# Patient Record
Sex: MALE | Race: WHITE | NOT HISPANIC OR LATINO | Employment: OTHER | URBAN - METROPOLITAN AREA
[De-identification: names, ages, dates, MRNs, and addresses within clinical notes are randomized per-mention and may not be internally consistent; named-entity substitution may affect disease eponyms.]

---

## 2019-08-23 RX ORDER — IRBESARTAN AND HYDROCHLOROTHIAZIDE 300; 12.5 MG/1; MG/1
1 TABLET, FILM COATED ORAL DAILY
Qty: 90 TABLET | Refills: 1 | Status: SHIPPED | OUTPATIENT
Start: 2019-08-23 | End: 2019-10-31 | Stop reason: SDUPTHER

## 2019-10-31 ENCOUNTER — OFFICE VISIT (OUTPATIENT)
Dept: CARDIOLOGY | Facility: CLINIC | Age: 74
End: 2019-10-31

## 2019-10-31 VITALS
SYSTOLIC BLOOD PRESSURE: 143 MMHG | DIASTOLIC BLOOD PRESSURE: 80 MMHG | OXYGEN SATURATION: 96 % | HEIGHT: 68 IN | HEART RATE: 67 BPM | WEIGHT: 315 LBS | BODY MASS INDEX: 47.74 KG/M2

## 2019-10-31 DIAGNOSIS — E78.5 DYSLIPIDEMIA: ICD-10-CM

## 2019-10-31 DIAGNOSIS — I25.118 CORONARY ARTERY DISEASE OF NATIVE ARTERY OF NATIVE HEART WITH STABLE ANGINA PECTORIS (HCC): Primary | ICD-10-CM

## 2019-10-31 PROCEDURE — 99213 OFFICE O/P EST LOW 20 MIN: CPT | Performed by: INTERNAL MEDICINE

## 2019-10-31 PROCEDURE — 93000 ELECTROCARDIOGRAM COMPLETE: CPT | Performed by: INTERNAL MEDICINE

## 2019-10-31 RX ORDER — ALLOPURINOL 100 MG/1
TABLET ORAL 2 TIMES DAILY
COMMUNITY

## 2019-10-31 RX ORDER — ROSUVASTATIN CALCIUM 20 MG/1
TABLET, COATED ORAL DAILY
COMMUNITY
Start: 2019-09-10 | End: 2019-10-31 | Stop reason: SDUPTHER

## 2019-10-31 RX ORDER — CARVEDILOL 6.25 MG/1
6.25 TABLET ORAL 2 TIMES DAILY WITH MEALS
Qty: 180 TABLET | Refills: 4 | Status: SHIPPED | OUTPATIENT
Start: 2019-10-31 | End: 2019-11-05 | Stop reason: SDUPTHER

## 2019-10-31 RX ORDER — IRBESARTAN AND HYDROCHLOROTHIAZIDE 300; 12.5 MG/1; MG/1
1 TABLET, FILM COATED ORAL DAILY
Qty: 90 TABLET | Refills: 4 | Status: SHIPPED | OUTPATIENT
Start: 2019-10-31 | End: 2020-01-01

## 2019-10-31 RX ORDER — TRIAMTERENE AND HYDROCHLOROTHIAZIDE 37.5; 25 MG/1; MG/1
1 CAPSULE ORAL DAILY
Qty: 90 CAPSULE | Refills: 4 | Status: SHIPPED | OUTPATIENT
Start: 2019-10-31

## 2019-10-31 RX ORDER — TRIAMTERENE AND HYDROCHLOROTHIAZIDE 37.5; 25 MG/1; MG/1
CAPSULE ORAL DAILY
COMMUNITY
Start: 2019-09-07 | End: 2019-10-31 | Stop reason: SDUPTHER

## 2019-10-31 RX ORDER — CARVEDILOL 6.25 MG/1
TABLET ORAL
COMMUNITY
Start: 2019-09-07 | End: 2019-10-31 | Stop reason: SDUPTHER

## 2019-10-31 RX ORDER — NITROGLYCERIN 0.4 MG/1
TABLET SUBLINGUAL
COMMUNITY
Start: 2019-03-18 | End: 2019-10-31 | Stop reason: SDUPTHER

## 2019-10-31 RX ORDER — ROSUVASTATIN CALCIUM 20 MG/1
20 TABLET, COATED ORAL DAILY
Qty: 90 TABLET | Refills: 4 | Status: SHIPPED | OUTPATIENT
Start: 2019-10-31

## 2019-10-31 RX ORDER — CLOPIDOGREL BISULFATE 75 MG/1
75 TABLET ORAL DAILY
Qty: 90 TABLET | Refills: 4 | Status: SHIPPED | OUTPATIENT
Start: 2019-10-31

## 2019-10-31 RX ORDER — NITROGLYCERIN 0.4 MG/1
0.4 TABLET SUBLINGUAL
Qty: 25 TABLET | Refills: 4 | Status: SHIPPED | OUTPATIENT
Start: 2019-10-31

## 2019-10-31 RX ORDER — CLOPIDOGREL BISULFATE 75 MG/1
75 TABLET ORAL DAILY
Refills: 4 | COMMUNITY
Start: 2019-10-26 | End: 2019-10-31 | Stop reason: SDUPTHER

## 2019-10-31 RX ORDER — KRILL/OM-3/DHA/EPA/PHOSPHO/AST 1000-230MG
CAPSULE ORAL DAILY
COMMUNITY
End: 2020-01-01

## 2019-10-31 NOTE — PROGRESS NOTES
"   C:  CAD, PCI/stenting, Dyslipidemia     Mr. Paxton Wilkerson is a very pleasant, 73 years old gentleman with history of morbid exogenous obesity, hypertension, dyslipidemia, coronary artery disease, status post previous PCI stenting of right coronary artery       He is doing very well with no recurrence of angina.  He offers no complaints of dyspnea or palpitations.    His blood pressure was stable 143/80. /80   Pulse 67 Comment: irregular  Ht 172.7 cm (68\")   Wt (!) 152 kg (335 lb 3.2 oz)   SpO2 96%   BMI 50.97 kg/m²     Indication for EKG: Coronary artery disease previous PCI stenting  His EKG showed normal sinus rhythm versus ectopic atrial rhythm with a rate of 67 bpm 1 PVC was noted.  Borderline first-degree AV block was noted with VA interval of 210 QRS duration 99 ms  ms and QRS axis was 59.      He could not afford  vascepa which was prescribed during his last visit.  He is lost 20 pounds and is working to to lose more weight.  His most recent lipid profile on 8/22/2019 showed total cholesterol of 152 HDL 42 triglycerides 218 which is an improvement as compared to previous lipids which showed that it was 255, LDL was 78.    Assessment/plan:    1- CAD  status post PCI stenting.  No angina.     2- Dyslipidemia.    See above      Thanks very much for allowing us to participate in the care patients                            Problems: Active problems were reviewed with the patient during this visit.  Medications: Medications were reviewed with the patient during this visit.  Allergies: Allergies were reviewed with the patient during this visit.  No Known Allergy.                   Past Medical History:     Reviewed history from 03/31/2014 and no changes required:        Obesity        Coronary Artery Disease: S/P PCI         Fatigue        [place] Pectoris        Hyperlipidemia        Hypertensive heart disease        Arthritis         Hypertension        Gout      Past Surgical History:     " Reviewed history from 10/09/2014 and no changes required:        Cardiac Cath with PCI and Stent;  9/22/2004        PCI in 1990s        Appendectomy        Total Hip Arthroplasty: Left - April 7, 2014     Active Medications (reviewed today):  CRESTOR 20 MG ORAL TABLET (ROSUVASTATIN CALCIUM) Take once a day by mouth.  TRIAMTERENE-HCTZ 37.5-25 MG ORAL CAPSULE (TRIAMTERENE-HCTZ) Take one (1) tablet by mouth daily.  GNP KRILL OIL OMEGA-3 CAPSULE (KRILL OIL CAPS) Take one (1) tablet by mouth daily.  MULTIVITAMINS TABS (MULTIPLE VITAMIN) Take one by mouth daily  CARVEDILOL 12.5 MG ORAL TABLET (CARVEDILOL) Take one (1) tablet by mouth twice a day  ALLOPURINOL 100 MG ORAL TABLET (ALLOPURINOL) Take one (1) tablet by mouth twice a day  ASPIRIN 81 MG ORAL TABLET (ASPIRIN) Take 1 tablet by mouth daily  CLOPIDOGREL BISULFATE 75 MG ORAL TABLET (CLOPIDOGREL BISULFATE) Take 1 tablet by mouth daily  IRBESARTAN-HYDROCHLOROTHIAZIDE 300-12.5 MG ORAL TABLET (IRBESARTAN-HYDROCHLOROTHIAZIDE) Take one (1) tablet by mouth daily.  NITROSTAT 0.4 MG SUBLINGUAL TABLET SUBLINGUAL (NITROGLYCERIN) Take 1 as directed     Current Allergies (reviewed today):  No known allergies     Family History Summary:      Reviewed history Last on 09/20/2018 and no changes required:03/18/2019  Sister - Has Family History of Other Cancer - Entered On: 3/27/2017     General Comments - FH:  FH Heart Disease-father passed away age 51 MI   FH Stroke-mother - TIA's   FH Hypertension; maternal side         Social History:     Reviewed history from 09/20/2018 and no changes required:         Marital Status:                 Children: 1 daughter                 Occupation: Returned to working with [employer]           Risk Factors:      Smoked Tobacco Use:  Former smoker     Cigarettes:  Yes -- 1 pack(s) per day,    Pack-years:  15 years - 1 ppd        Year started:  age 19        Year quit:  many years , then chewed tobacco for awhile  Smokeless Tobacco Use:   Former     Tobacco Use Comments:  former chew tobacco   Passive smoke exposure:  yes  Drug use:  no  HIV high-risk behavior:  no  Caffeine use:  3 drinks per day  Alcohol use:  no  Exercise:  no  Seatbelt use:  100 %  Sun Exposure:  occasionally     Family History Risk Factors:     Family History of MI in females < 65 years old:  no     Family History of MI in males < 55 years old:  yes           Review of Systems   General: denies fevers, chills, sweats, anorexia, fatigue, malaise, weight loss  Eyes: denies blurring, diplopia, irritation, discharge, vision loss, eye pain, photophobia  Ear/Nose/Throat: denies ear pain or discharge, tinnitus, decreased hearing, nasal obstruction or discharge, nosebleeds, sore throat, hoarseness, dysphagia  Cardiovascular: Coronary artery disease. Status post PCI stenting in 1990s and then subsequently in 2004. Hypertension. Dyslipidemia.  Respiratory: Denies cough, dyspnea, excessive sputum, hemoptysis, wheezing  Gastrointestinal: Denies nausea, vomiting, diarrhea, constipation, change in bowel habits, abdominal pain, melena, hematochezia, jaundice  Musculoskeletal: denies back pain, joint pain, joint swelling, muscle cramps, muscle weakness, stiffness, arthritis  Neurologic: denies transient paralysis, weakness, paresthesias, seizures, syncope, tremors, vertigo        Physical Exam     General:      well developed, well nourished, in no acute distress.    Neck:      no masses, thyromegaly, or abnormal cervical nodes.   no JVD. No carotid bruits  Lungs:      clear bilaterally to auscultation.    Heart:      non-displaced PMI, chest non-tender; regular rate and rhythm, S1, S2 without murmurs, rubs, or gallops  Pulses:      pulses normal in all 4 extremities.    Extremities:       no edema

## 2019-11-05 RX ORDER — CARVEDILOL 6.25 MG/1
6.25 TABLET ORAL 2 TIMES DAILY WITH MEALS
Qty: 270 TABLET | Refills: 2 | Status: SHIPPED | OUTPATIENT
Start: 2019-11-05 | End: 2020-01-01

## 2020-01-01 ENCOUNTER — APPOINTMENT (OUTPATIENT)
Dept: GENERAL RADIOLOGY | Facility: HOSPITAL | Age: 75
End: 2020-01-01

## 2020-01-01 ENCOUNTER — ANESTHESIA EVENT (OUTPATIENT)
Dept: PERIOP | Facility: HOSPITAL | Age: 75
End: 2020-01-01

## 2020-01-01 ENCOUNTER — ANESTHESIA (OUTPATIENT)
Dept: PERIOP | Facility: HOSPITAL | Age: 75
End: 2020-01-01

## 2020-01-01 ENCOUNTER — APPOINTMENT (OUTPATIENT)
Dept: CARDIOLOGY | Facility: HOSPITAL | Age: 75
End: 2020-01-01

## 2020-01-01 ENCOUNTER — HOSPITAL ENCOUNTER (INPATIENT)
Facility: HOSPITAL | Age: 75
LOS: 3 days | End: 2021-01-01
Attending: INTERNAL MEDICINE | Admitting: THORACIC SURGERY (CARDIOTHORACIC VASCULAR SURGERY)

## 2020-01-01 DIAGNOSIS — R77.8 ELEVATED TROPONIN: ICD-10-CM

## 2020-01-01 DIAGNOSIS — I25.10 CORONARY ARTERY DISEASE INVOLVING NATIVE CORONARY ARTERY OF NATIVE HEART WITHOUT ANGINA PECTORIS: ICD-10-CM

## 2020-01-01 DIAGNOSIS — J81.0 ACUTE PULMONARY EDEMA (HCC): ICD-10-CM

## 2020-01-01 DIAGNOSIS — R06.09 EXERTIONAL DYSPNEA: ICD-10-CM

## 2020-01-01 DIAGNOSIS — I25.110 CORONARY ARTERY DISEASE INVOLVING NATIVE CORONARY ARTERY OF NATIVE HEART WITH UNSTABLE ANGINA PECTORIS (HCC): ICD-10-CM

## 2020-01-01 DIAGNOSIS — I21.4 NON-ST ELEVATION MYOCARDIAL INFARCTION (NSTEMI) (HCC): Primary | ICD-10-CM

## 2020-01-01 DIAGNOSIS — R07.9 CHEST PAIN, UNSPECIFIED TYPE: ICD-10-CM

## 2020-01-01 DIAGNOSIS — N28.9 ACUTE RENAL INSUFFICIENCY: ICD-10-CM

## 2020-01-01 DIAGNOSIS — J96.01 ACUTE RESPIRATORY FAILURE WITH HYPOXIA (HCC): ICD-10-CM

## 2020-01-01 DIAGNOSIS — I10 ESSENTIAL HYPERTENSION: ICD-10-CM

## 2020-01-01 LAB
ABO GROUP BLD: NORMAL
ACT BLD: 114 SECONDS (ref 89–137)
ACT BLD: 120 SECONDS (ref 89–137)
ACT BLD: 131 SECONDS (ref 89–137)
ACT BLD: 340 SECONDS (ref 89–137)
ACT BLD: 389 SECONDS (ref 89–137)
ACT BLD: 444 SECONDS (ref 89–137)
ACT BLD: 455 SECONDS (ref 89–137)
ACT BLD: 813 SECONDS (ref 89–137)
ALBUMIN SERPL-MCNC: 3.7 G/DL (ref 3.5–5.2)
ALBUMIN SERPL-MCNC: 3.7 G/DL (ref 3.5–5.2)
ALBUMIN SERPL-MCNC: 3.8 G/DL (ref 3.5–5.2)
ALBUMIN SERPL-MCNC: 4.1 G/DL (ref 3.5–5.2)
ALBUMIN/GLOB SERPL: 1.2 G/DL
ALP SERPL-CCNC: 49 U/L (ref 39–117)
ALT SERPL W P-5'-P-CCNC: 21 U/L (ref 1–41)
ANION GAP SERPL CALCULATED.3IONS-SCNC: 11 MMOL/L (ref 5–15)
ANION GAP SERPL CALCULATED.3IONS-SCNC: 12 MMOL/L (ref 5–15)
ANION GAP SERPL CALCULATED.3IONS-SCNC: 12 MMOL/L (ref 5–15)
ANION GAP SERPL CALCULATED.3IONS-SCNC: 13 MMOL/L (ref 5–15)
ANION GAP SERPL CALCULATED.3IONS-SCNC: 14 MMOL/L (ref 5–15)
ANION GAP SERPL CALCULATED.3IONS-SCNC: 15 MMOL/L (ref 5–15)
ANION GAP SERPL CALCULATED.3IONS-SCNC: 16 MMOL/L (ref 5–15)
ANION GAP SERPL CALCULATED.3IONS-SCNC: 16 MMOL/L (ref 5–15)
APTT PPP: 25.2 SECONDS (ref 24–31)
APTT PPP: 26.4 SECONDS (ref 24–31)
APTT PPP: 27.4 SECONDS (ref 24–31)
APTT PPP: 28.7 SECONDS (ref 61–76.5)
APTT PPP: 36.5 SECONDS (ref 61–76.5)
APTT PPP: 41.5 SECONDS (ref 61–76.5)
ARTERIAL PATENCY WRIST A: ABNORMAL
AST SERPL-CCNC: 25 U/L (ref 1–40)
ATMOSPHERIC PRESS: ABNORMAL MM[HG]
BACTERIA SPEC AEROBE CULT: ABNORMAL
BACTERIA UR QL AUTO: ABNORMAL /HPF
BASE DEFICIT: ABNORMAL
BASE EXCESS BLDA CALC-SCNC: -0.9 MMOL/L (ref 0–3)
BASE EXCESS BLDA CALC-SCNC: -2.2 MMOL/L (ref 0–3)
BASE EXCESS BLDA CALC-SCNC: -2.6 MMOL/L (ref 0–3)
BASE EXCESS BLDA CALC-SCNC: -3 MMOL/L (ref 0–3)
BASE EXCESS BLDA CALC-SCNC: -3 MMOL/L (ref 0–3)
BASE EXCESS BLDA CALC-SCNC: -3.6 MMOL/L (ref 0–3)
BASE EXCESS BLDA CALC-SCNC: -3.7 MMOL/L (ref 0–3)
BASE EXCESS BLDA CALC-SCNC: -4.1 MMOL/L (ref 0–3)
BASE EXCESS BLDA CALC-SCNC: -5.1 MMOL/L (ref 0–3)
BASE EXCESS BLDA CALC-SCNC: -8.3 MMOL/L (ref 0–3)
BASE EXCESS BLDA CALC-SCNC: 0 MMOL/L (ref 0–3)
BASE EXCESS BLDA CALC-SCNC: 3 MMOL/L (ref 0–3)
BASE EXCESS BLDA CALC-SCNC: 4 MMOL/L (ref 0–3)
BASE EXCESS BLDA CALC-SCNC: <0 MMOL/L (ref 0–3)
BASE EXCESS BLDV CALC-SCNC: ABNORMAL MMOL/L
BASOPHILS # BLD AUTO: 0 10*3/MM3 (ref 0–0.2)
BASOPHILS # BLD AUTO: 0 10*3/MM3 (ref 0–0.2)
BASOPHILS # BLD AUTO: 0.1 10*3/MM3 (ref 0–0.2)
BASOPHILS NFR BLD AUTO: 0 % (ref 0–1.5)
BASOPHILS NFR BLD AUTO: 0.2 % (ref 0–1.5)
BASOPHILS NFR BLD AUTO: 0.4 % (ref 0–1.5)
BASOPHILS NFR BLD AUTO: 0.7 % (ref 0–1.5)
BASOPHILS NFR BLD AUTO: 0.9 % (ref 0–1.5)
BDY SITE: ABNORMAL
BH BB BLOOD EXPIRATION DATE: NORMAL
BH BB BLOOD TYPE BARCODE: 6200
BH BB BLOOD TYPE BARCODE: 8400
BH BB DISPENSE STATUS: NORMAL
BH BB PRODUCT CODE: NORMAL
BH BB UNIT NUMBER: NORMAL
BH CV ECHO MEAS - ACS: 2.1 CM
BH CV ECHO MEAS - AO MAX PG (FULL): 1.3 MMHG
BH CV ECHO MEAS - AO MAX PG: 3.2 MMHG
BH CV ECHO MEAS - AO MEAN PG (FULL): 1.4 MMHG
BH CV ECHO MEAS - AO MEAN PG: 2.6 MMHG
BH CV ECHO MEAS - AO ROOT AREA (BSA CORRECTED): 1.2
BH CV ECHO MEAS - AO ROOT AREA: 6.8 CM^2
BH CV ECHO MEAS - AO ROOT DIAM: 3 CM
BH CV ECHO MEAS - AO V2 MAX: 90 CM/SEC
BH CV ECHO MEAS - AO V2 MEAN: 80 CM/SEC
BH CV ECHO MEAS - AO V2 VTI: 17.3 CM
BH CV ECHO MEAS - AORTIC HR: 92.2 BPM
BH CV ECHO MEAS - AORTIC R-R: 0.65 SEC
BH CV ECHO MEAS - ASC AORTA: 2.8 CM
BH CV ECHO MEAS - AVA(I,A): 2.2 CM^2
BH CV ECHO MEAS - AVA(I,D): 2.2 CM^2
BH CV ECHO MEAS - AVA(V,A): 2.1 CM^2
BH CV ECHO MEAS - AVA(V,D): 2.1 CM^2
BH CV ECHO MEAS - BSA(HAYCOCK): 2.8 M^2
BH CV ECHO MEAS - BSA: 2.6 M^2
BH CV ECHO MEAS - BZI_BMI: 46.6 KILOGRAMS/M^2
BH CV ECHO MEAS - BZI_METRIC_HEIGHT: 177.8 CM
BH CV ECHO MEAS - BZI_METRIC_WEIGHT: 147.4 KG
BH CV ECHO MEAS - CI(AO): 4.3 L/MIN/M^2
BH CV ECHO MEAS - CI(LVOT): 1.4 L/MIN/M^2
BH CV ECHO MEAS - CO(AO): 10.9 L/MIN
BH CV ECHO MEAS - CO(LVOT): 3.5 L/MIN
BH CV ECHO MEAS - EDV(CUBED): 188.2 ML
BH CV ECHO MEAS - EDV(MOD-SP4): 169 ML
BH CV ECHO MEAS - EDV(TEICH): 162 ML
BH CV ECHO MEAS - EF(CUBED): 41.2 %
BH CV ECHO MEAS - EF(MOD-BP): 41 %
BH CV ECHO MEAS - EF(MOD-SP4): 41.5 %
BH CV ECHO MEAS - EF(TEICH): 33.6 %
BH CV ECHO MEAS - ESV(CUBED): 110.7 ML
BH CV ECHO MEAS - ESV(MOD-SP4): 98.9 ML
BH CV ECHO MEAS - ESV(TEICH): 107.6 ML
BH CV ECHO MEAS - FS: 16.2 %
BH CV ECHO MEAS - IVS/LVPW: 1.2
BH CV ECHO MEAS - IVSD: 1.5 CM
BH CV ECHO MEAS - LA DIMENSION(2D): 4.8 CM
BH CV ECHO MEAS - LA DIMENSION: 4.7 CM
BH CV ECHO MEAS - LA/AO: 1.6
BH CV ECHO MEAS - LV DIASTOLIC VOL/BSA (35-75): 65.9 ML/M^2
BH CV ECHO MEAS - LV MASS(C)D: 359.2 GRAMS
BH CV ECHO MEAS - LV MASS(C)DI: 140 GRAMS/M^2
BH CV ECHO MEAS - LV MAX PG: 2 MMHG
BH CV ECHO MEAS - LV MEAN PG: 1.2 MMHG
BH CV ECHO MEAS - LV SYSTOLIC VOL/BSA (12-30): 38.5 ML/M^2
BH CV ECHO MEAS - LV V1 MAX: 70.1 CM/SEC
BH CV ECHO MEAS - LV V1 MEAN: 52.3 CM/SEC
BH CV ECHO MEAS - LV V1 VTI: 13.7 CM
BH CV ECHO MEAS - LVIDD: 5.7 CM
BH CV ECHO MEAS - LVIDS: 4.8 CM
BH CV ECHO MEAS - LVOT AREA: 2.8 CM^2
BH CV ECHO MEAS - LVOT DIAM: 1.9 CM
BH CV ECHO MEAS - LVPWD: 1.3 CM
BH CV ECHO MEAS - MR MAX PG: 64 MMHG
BH CV ECHO MEAS - MR MAX VEL: 399.9 CM/SEC
BH CV ECHO MEAS - MV A MAX VEL: 56.6 CM/SEC
BH CV ECHO MEAS - MV DEC SLOPE: 654 CM/SEC^2
BH CV ECHO MEAS - MV DEC TIME: 0.14 SEC
BH CV ECHO MEAS - MV E MAX VEL: 90.4 CM/SEC
BH CV ECHO MEAS - MV E/A: 1.6
BH CV ECHO MEAS - MV MAX PG: 3.7 MMHG
BH CV ECHO MEAS - MV MEAN PG: 2.2 MMHG
BH CV ECHO MEAS - MV V2 MAX: 95.8 CM/SEC
BH CV ECHO MEAS - MV V2 MEAN: 70.6 CM/SEC
BH CV ECHO MEAS - MV V2 VTI: 19.3 CM
BH CV ECHO MEAS - MVA(VTI): 2 CM^2
BH CV ECHO MEAS - PA ACC TIME: 0.1 SEC
BH CV ECHO MEAS - PA MAX PG (FULL): 0.93 MMHG
BH CV ECHO MEAS - PA MAX PG: 2.4 MMHG
BH CV ECHO MEAS - PA MEAN PG (FULL): 0.5 MMHG
BH CV ECHO MEAS - PA MEAN PG: 1.4 MMHG
BH CV ECHO MEAS - PA PR(ACCEL): 34.5 MMHG
BH CV ECHO MEAS - PA V2 MAX: 76.8 CM/SEC
BH CV ECHO MEAS - PA V2 MEAN: 56.1 CM/SEC
BH CV ECHO MEAS - PA V2 VTI: 14.3 CM
BH CV ECHO MEAS - PVA(I,A): 3.4 CM^2
BH CV ECHO MEAS - PVA(I,D): 3.4 CM^2
BH CV ECHO MEAS - PVA(V,A): 3.9 CM^2
BH CV ECHO MEAS - PVA(V,D): 3.9 CM^2
BH CV ECHO MEAS - QP/QS: 1.3
BH CV ECHO MEAS - RAP SYSTOLE: 3 MMHG
BH CV ECHO MEAS - RV MAX PG: 1.4 MMHG
BH CV ECHO MEAS - RV MEAN PG: 0.87 MMHG
BH CV ECHO MEAS - RV V1 MAX: 59.8 CM/SEC
BH CV ECHO MEAS - RV V1 MEAN: 44.6 CM/SEC
BH CV ECHO MEAS - RV V1 VTI: 9.8 CM
BH CV ECHO MEAS - RVDD: 2.4 CM
BH CV ECHO MEAS - RVOT AREA: 5 CM^2
BH CV ECHO MEAS - RVOT DIAM: 2.5 CM
BH CV ECHO MEAS - RVSP: 35.4 MMHG
BH CV ECHO MEAS - SI(AO): 46.3 ML/M^2
BH CV ECHO MEAS - SI(CUBED): 30.2 ML/M^2
BH CV ECHO MEAS - SI(LVOT): 14.8 ML/M^2
BH CV ECHO MEAS - SI(MOD-SP4): 27.3 ML/M^2
BH CV ECHO MEAS - SI(TEICH): 21.2 ML/M^2
BH CV ECHO MEAS - SV(AO): 118.7 ML
BH CV ECHO MEAS - SV(CUBED): 77.5 ML
BH CV ECHO MEAS - SV(LVOT): 37.9 ML
BH CV ECHO MEAS - SV(MOD-SP4): 70.1 ML
BH CV ECHO MEAS - SV(RVOT): 48.8 ML
BH CV ECHO MEAS - SV(TEICH): 54.4 ML
BH CV ECHO MEAS - TR MAX VEL: 280.3 CM/SEC
BH CV XLRA MEAS - DIST GSV THIGH DIST LEFT: 0.49 CM
BH CV XLRA MEAS - DIST GSV THIGH DIST RIGHT: 0.37 CM
BH CV XLRA MEAS - GSV ANKLE DIST LEFT: 0.16 CM
BH CV XLRA MEAS - GSV ANKLE DIST RIGHT: 0.21 CM
BH CV XLRA MEAS - MID GSV CALF LEFT: 0.13 CM
BH CV XLRA MEAS - MID GSV CALF RIGHT: 0.21 CM
BH CV XLRA MEAS - MID GSV THIGH  LEFT: 0.61 CM
BH CV XLRA MEAS - MID GSV THIGH  RIGHT: 0.39 CM
BH CV XLRA MEAS - PROX GSV CALF DIST LEFT: 0.15 CM
BH CV XLRA MEAS - PROX GSV CALF DIST RIGHT: 0.14 CM
BH CV XLRA MEAS - PROX GSV THIGH  LEFT: 0.45 CM
BH CV XLRA MEAS - PROX GSV THIGH  RIGHT: 0.42 CM
BH CV XLRA MEAS LEFT CCA RATIO VEL: 78 CM/SEC
BH CV XLRA MEAS LEFT DIST CCA PSV: 78 CM/SEC
BH CV XLRA MEAS LEFT DIST ICA PSV: -78.8 CM/SEC
BH CV XLRA MEAS LEFT ICA RATIO VEL: -78.8 CM/SEC
BH CV XLRA MEAS LEFT ICA/CCA RATIO: -1
BH CV XLRA MEAS LEFT PROX CCA PSV: 71.9 CM/SEC
BH CV XLRA MEAS LEFT PROX ECA PSV: -88.4 CM/SEC
BH CV XLRA MEAS LEFT PROX ICA PSV: -74.5 CM/SEC
BH CV XLRA MEAS LEFT PROX SCLA PSV: 70.2 CM/SEC
BH CV XLRA MEAS LEFT VERTEBRAL A PSV: -40.7 CM/SEC
BH CV XLRA MEAS RIGHT CCA RATIO VEL: 69.6 CM/SEC
BH CV XLRA MEAS RIGHT DIST CCA PSV: 65.9 CM/SEC
BH CV XLRA MEAS RIGHT DIST ICA PSV: -74.3 CM/SEC
BH CV XLRA MEAS RIGHT ICA RATIO VEL: -74.3 CM/SEC
BH CV XLRA MEAS RIGHT ICA/CCA RATIO: -1.1
BH CV XLRA MEAS RIGHT PROX CCA PSV: 69.6 CM/SEC
BH CV XLRA MEAS RIGHT PROX ECA PSV: -56.5 CM/SEC
BH CV XLRA MEAS RIGHT PROX ICA PSV: -67.7 CM/SEC
BH CV XLRA MEAS RIGHT PROX SCLA PSV: 84.9 CM/SEC
BH CV XLRA MEAS RIGHT VERTEBRAL A PSV: -44.7 CM/SEC
BILIRUB SERPL-MCNC: 0.7 MG/DL (ref 0–1.2)
BILIRUB UR QL STRIP: NEGATIVE
BLD GP AB SCN SERPL QL: NEGATIVE
BUN SERPL-MCNC: 41 MG/DL (ref 8–23)
BUN SERPL-MCNC: 45 MG/DL (ref 8–23)
BUN SERPL-MCNC: 46 MG/DL (ref 8–23)
BUN SERPL-MCNC: 48 MG/DL (ref 8–23)
BUN SERPL-MCNC: 49 MG/DL (ref 8–23)
BUN SERPL-MCNC: 52 MG/DL (ref 8–23)
BUN SERPL-MCNC: 53 MG/DL (ref 8–23)
BUN SERPL-MCNC: 55 MG/DL (ref 8–23)
BUN/CREAT SERPL: 23.7 (ref 7–25)
BUN/CREAT SERPL: 24.3 (ref 7–25)
BUN/CREAT SERPL: 24.3 (ref 7–25)
BUN/CREAT SERPL: 27.9 (ref 7–25)
BUN/CREAT SERPL: 28.3 (ref 7–25)
BUN/CREAT SERPL: 30.2 (ref 7–25)
BUN/CREAT SERPL: 34.4 (ref 7–25)
BUN/CREAT SERPL: 37.6 (ref 7–25)
CA-I BLDA-SCNC: 1.02 MMOL/L (ref 1.15–1.33)
CA-I BLDA-SCNC: 1.04 MMOL/L (ref 1.15–1.33)
CA-I BLDA-SCNC: 1.07 MMOL/L (ref 1.15–1.33)
CA-I BLDA-SCNC: 1.08 MMOL/L (ref 1.15–1.33)
CA-I BLDA-SCNC: 1.13 MMOL/L (ref 1.15–1.33)
CA-I BLDA-SCNC: 1.14 MMOL/L (ref 1.15–1.33)
CA-I BLDA-SCNC: 1.16 MMOL/L (ref 1.12–1.32)
CA-I BLDA-SCNC: 1.17 MMOL/L (ref 1.12–1.32)
CA-I BLDA-SCNC: 1.18 MMOL/L (ref 1.15–1.33)
CA-I BLDA-SCNC: 1.19 MMOL/L (ref 1.12–1.32)
CA-I BLDA-SCNC: 1.22 MMOL/L (ref 1.12–1.32)
CA-I BLDA-SCNC: 1.22 MMOL/L (ref 1.15–1.33)
CA-I BLDA-SCNC: 1.25 MMOL/L (ref 1.12–1.32)
CA-I BLDA-SCNC: 1.27 MMOL/L (ref 1.12–1.32)
CA-I BLDA-SCNC: 1.31 MMOL/L (ref 1.12–1.32)
CA-I SERPL ISE-MCNC: 1.17 MMOL/L (ref 1.2–1.3)
CA-I SERPL ISE-MCNC: 1.2 MMOL/L (ref 1.2–1.3)
CA-I SERPL ISE-MCNC: 1.2 MMOL/L (ref 1.2–1.3)
CALCIUM SPEC-SCNC: 8.7 MG/DL (ref 8.6–10.5)
CALCIUM SPEC-SCNC: 8.8 MG/DL (ref 8.6–10.5)
CALCIUM SPEC-SCNC: 9 MG/DL (ref 8.6–10.5)
CALCIUM SPEC-SCNC: 9.2 MG/DL (ref 8.6–10.5)
CALCIUM SPEC-SCNC: 9.2 MG/DL (ref 8.6–10.5)
CALCIUM SPEC-SCNC: 9.6 MG/DL (ref 8.6–10.5)
CHLORIDE SERPL-SCNC: 100 MMOL/L (ref 98–107)
CHLORIDE SERPL-SCNC: 101 MMOL/L (ref 98–107)
CHLORIDE SERPL-SCNC: 103 MMOL/L (ref 98–107)
CHLORIDE SERPL-SCNC: 105 MMOL/L (ref 98–107)
CHLORIDE SERPL-SCNC: 105 MMOL/L (ref 98–107)
CHLORIDE SERPL-SCNC: 106 MMOL/L (ref 98–107)
CHLORIDE SERPL-SCNC: 109 MMOL/L (ref 98–107)
CHLORIDE SERPL-SCNC: 111 MMOL/L (ref 98–107)
CHOLEST SERPL-MCNC: 79 MG/DL (ref 0–200)
CLARITY UR: ABNORMAL
CLOSE TME COLL+ADP + EPINEP PNL BLD: 76 % (ref 86–100)
CLOSE TME COLL+ADP + EPINEP PNL BLD: 89 % (ref 86–100)
CLOSE TME COLL+ADP + EPINEP PNL BLD: 91 % (ref 86–100)
CO2 BLDA-SCNC: 18.8 MMOL/L (ref 22–29)
CO2 BLDA-SCNC: 20.7 MMOL/L (ref 22–29)
CO2 BLDA-SCNC: 22.3 MMOL/L (ref 22–29)
CO2 BLDA-SCNC: 22.4 MMOL/L (ref 22–29)
CO2 BLDA-SCNC: 22.5 MMOL/L (ref 22–29)
CO2 BLDA-SCNC: 22.9 MMOL/L (ref 22–29)
CO2 BLDA-SCNC: 23 MMOL/L (ref 22–29)
CO2 BLDA-SCNC: 23.5 MMOL/L (ref 22–29)
CO2 BLDA-SCNC: 23.7 MMOL/L (ref 22–29)
CO2 BLDA-SCNC: 24 MMOL/L (ref 23–27)
CO2 BLDA-SCNC: 24.2 MMOL/L (ref 22–29)
CO2 BLDA-SCNC: 25 MMOL/L (ref 23–27)
CO2 BLDA-SCNC: 27 MMOL/L (ref 23–27)
CO2 BLDA-SCNC: 27 MMOL/L (ref 23–27)
CO2 BLDA-SCNC: 29 MMOL/L (ref 23–27)
CO2 BLDA-SCNC: 30 MMOL/L (ref 23–27)
CO2 CONTENT VENOUS: ABNORMAL
CO2 SERPL-SCNC: 19 MMOL/L (ref 22–29)
CO2 SERPL-SCNC: 20 MMOL/L (ref 22–29)
CO2 SERPL-SCNC: 20 MMOL/L (ref 22–29)
CO2 SERPL-SCNC: 21 MMOL/L (ref 22–29)
CO2 SERPL-SCNC: 21 MMOL/L (ref 22–29)
CO2 SERPL-SCNC: 22 MMOL/L (ref 22–29)
CO2 SERPL-SCNC: 23 MMOL/L (ref 22–29)
CO2 SERPL-SCNC: 23 MMOL/L (ref 22–29)
COLOR UR: YELLOW
CREAT SERPL-MCNC: 1.41 MG/DL (ref 0.76–1.27)
CREAT SERPL-MCNC: 1.6 MG/DL (ref 0.76–1.27)
CREAT SERPL-MCNC: 1.62 MG/DL (ref 0.76–1.27)
CREAT SERPL-MCNC: 1.69 MG/DL (ref 0.76–1.27)
CREAT SERPL-MCNC: 1.72 MG/DL (ref 0.76–1.27)
CREAT SERPL-MCNC: 1.84 MG/DL (ref 0.76–1.27)
CREAT SERPL-MCNC: 1.89 MG/DL (ref 0.76–1.27)
CREAT SERPL-MCNC: 1.9 MG/DL (ref 0.76–1.27)
CROSSMATCH INTERPRETATION: NORMAL
CROSSMATCH INTERPRETATION: NORMAL
D-LACTATE SERPL-SCNC: 1.4 MMOL/L (ref 0.5–2)
D-LACTATE SERPL-SCNC: 2 MMOL/L (ref 0.5–2)
D-LACTATE SERPL-SCNC: 2.3 MMOL/L (ref 0.5–2)
D-LACTATE SERPL-SCNC: 3.5 MMOL/L (ref 0.5–2)
DEPRECATED RDW RBC AUTO: 46.4 FL (ref 37–54)
DEPRECATED RDW RBC AUTO: 46.8 FL (ref 37–54)
DEPRECATED RDW RBC AUTO: 48.6 FL (ref 37–54)
EOSINOPHIL # BLD AUTO: 0 10*3/MM3 (ref 0–0.4)
EOSINOPHIL # BLD AUTO: 0 10*3/MM3 (ref 0–0.4)
EOSINOPHIL # BLD AUTO: 0.1 10*3/MM3 (ref 0–0.4)
EOSINOPHIL NFR BLD AUTO: 0 % (ref 0.3–6.2)
EOSINOPHIL NFR BLD AUTO: 0.1 % (ref 0.3–6.2)
EOSINOPHIL NFR BLD AUTO: 0.6 % (ref 0.3–6.2)
EOSINOPHIL NFR BLD AUTO: 0.7 % (ref 0.3–6.2)
EOSINOPHIL NFR BLD AUTO: 0.8 % (ref 0.3–6.2)
ERYTHROCYTE [DISTWIDTH] IN BLOOD BY AUTOMATED COUNT: 15.1 % (ref 12.3–15.4)
ERYTHROCYTE [DISTWIDTH] IN BLOOD BY AUTOMATED COUNT: 15.2 % (ref 12.3–15.4)
ERYTHROCYTE [DISTWIDTH] IN BLOOD BY AUTOMATED COUNT: 15.3 % (ref 12.3–15.4)
ERYTHROCYTE [DISTWIDTH] IN BLOOD BY AUTOMATED COUNT: 15.3 % (ref 12.3–15.4)
ERYTHROCYTE [DISTWIDTH] IN BLOOD BY AUTOMATED COUNT: 15.4 % (ref 12.3–15.4)
ERYTHROCYTE [DISTWIDTH] IN BLOOD BY AUTOMATED COUNT: 15.4 % (ref 12.3–15.4)
ERYTHROCYTE [DISTWIDTH] IN BLOOD BY AUTOMATED COUNT: 15.7 % (ref 12.3–15.4)
FERRITIN SERPL-MCNC: 296.7 NG/ML (ref 30–400)
FIBRINOGEN PPP-MCNC: 423 MG/DL (ref 210–450)
FIBRINOGEN PPP-MCNC: 423 MG/DL (ref 210–450)
GFR SERPL CREATININE-BSD FRML MDRD: 35 ML/MIN/1.73
GFR SERPL CREATININE-BSD FRML MDRD: 35 ML/MIN/1.73
GFR SERPL CREATININE-BSD FRML MDRD: 36 ML/MIN/1.73
GFR SERPL CREATININE-BSD FRML MDRD: 39 ML/MIN/1.73
GFR SERPL CREATININE-BSD FRML MDRD: 40 ML/MIN/1.73
GFR SERPL CREATININE-BSD FRML MDRD: 42 ML/MIN/1.73
GFR SERPL CREATININE-BSD FRML MDRD: 43 ML/MIN/1.73
GFR SERPL CREATININE-BSD FRML MDRD: 49 ML/MIN/1.73
GLOBULIN UR ELPH-MCNC: 3.1 GM/DL
GLUCOSE BLDC GLUCOMTR-MCNC: 105 MG/DL (ref 70–105)
GLUCOSE BLDC GLUCOMTR-MCNC: 115 MG/DL (ref 70–105)
GLUCOSE BLDC GLUCOMTR-MCNC: 116 MG/DL (ref 70–105)
GLUCOSE BLDC GLUCOMTR-MCNC: 116 MG/DL (ref 70–105)
GLUCOSE BLDC GLUCOMTR-MCNC: 118 MG/DL (ref 70–105)
GLUCOSE BLDC GLUCOMTR-MCNC: 119 MG/DL (ref 70–105)
GLUCOSE BLDC GLUCOMTR-MCNC: 121 MG/DL (ref 70–105)
GLUCOSE BLDC GLUCOMTR-MCNC: 122 MG/DL (ref 70–105)
GLUCOSE BLDC GLUCOMTR-MCNC: 122 MG/DL (ref 70–105)
GLUCOSE BLDC GLUCOMTR-MCNC: 125 MG/DL (ref 70–105)
GLUCOSE BLDC GLUCOMTR-MCNC: 128 MG/DL (ref 70–105)
GLUCOSE BLDC GLUCOMTR-MCNC: 129 MG/DL (ref 74–100)
GLUCOSE BLDC GLUCOMTR-MCNC: 129 MG/DL (ref 74–100)
GLUCOSE BLDC GLUCOMTR-MCNC: 131 MG/DL (ref 70–105)
GLUCOSE BLDC GLUCOMTR-MCNC: 131 MG/DL (ref 70–105)
GLUCOSE BLDC GLUCOMTR-MCNC: 132 MG/DL (ref 70–105)
GLUCOSE BLDC GLUCOMTR-MCNC: 133 MG/DL (ref 70–105)
GLUCOSE BLDC GLUCOMTR-MCNC: 134 MG/DL (ref 74–100)
GLUCOSE BLDC GLUCOMTR-MCNC: 139 MG/DL (ref 70–105)
GLUCOSE BLDC GLUCOMTR-MCNC: 139 MG/DL (ref 70–105)
GLUCOSE BLDC GLUCOMTR-MCNC: 142 MG/DL (ref 70–105)
GLUCOSE BLDC GLUCOMTR-MCNC: 142 MG/DL (ref 70–105)
GLUCOSE BLDC GLUCOMTR-MCNC: 143 MG/DL (ref 70–105)
GLUCOSE BLDC GLUCOMTR-MCNC: 145 MG/DL (ref 70–105)
GLUCOSE BLDC GLUCOMTR-MCNC: 147 MG/DL (ref 70–105)
GLUCOSE BLDC GLUCOMTR-MCNC: 149 MG/DL (ref 70–105)
GLUCOSE BLDC GLUCOMTR-MCNC: 152 MG/DL (ref 70–105)
GLUCOSE BLDC GLUCOMTR-MCNC: 153 MG/DL (ref 70–105)
GLUCOSE BLDC GLUCOMTR-MCNC: 155 MG/DL (ref 70–105)
GLUCOSE BLDC GLUCOMTR-MCNC: 156 MG/DL (ref 70–105)
GLUCOSE BLDC GLUCOMTR-MCNC: 156 MG/DL (ref 70–105)
GLUCOSE BLDC GLUCOMTR-MCNC: 157 MG/DL (ref 74–100)
GLUCOSE BLDC GLUCOMTR-MCNC: 157 MG/DL (ref 74–100)
GLUCOSE BLDC GLUCOMTR-MCNC: 158 MG/DL (ref 70–105)
GLUCOSE BLDC GLUCOMTR-MCNC: 163 MG/DL (ref 70–105)
GLUCOSE BLDC GLUCOMTR-MCNC: 166 MG/DL (ref 70–105)
GLUCOSE BLDC GLUCOMTR-MCNC: 169 MG/DL (ref 74–100)
GLUCOSE BLDC GLUCOMTR-MCNC: 169 MG/DL (ref 74–100)
GLUCOSE BLDC GLUCOMTR-MCNC: 170 MG/DL (ref 70–105)
GLUCOSE BLDC GLUCOMTR-MCNC: 172 MG/DL (ref 74–100)
GLUCOSE BLDC GLUCOMTR-MCNC: 172 MG/DL (ref 74–100)
GLUCOSE BLDC GLUCOMTR-MCNC: 176 MG/DL (ref 70–105)
GLUCOSE BLDC GLUCOMTR-MCNC: 179 MG/DL (ref 74–100)
GLUCOSE BLDC GLUCOMTR-MCNC: 179 MG/DL (ref 74–100)
GLUCOSE BLDC GLUCOMTR-MCNC: 185 MG/DL (ref 74–100)
GLUCOSE BLDC GLUCOMTR-MCNC: 185 MG/DL (ref 74–100)
GLUCOSE BLDC GLUCOMTR-MCNC: 186 MG/DL (ref 74–100)
GLUCOSE BLDC GLUCOMTR-MCNC: 186 MG/DL (ref 74–100)
GLUCOSE BLDC GLUCOMTR-MCNC: 190 MG/DL (ref 74–100)
GLUCOSE BLDC GLUCOMTR-MCNC: 190 MG/DL (ref 74–100)
GLUCOSE BLDC GLUCOMTR-MCNC: 194 MG/DL (ref 70–105)
GLUCOSE SERPL-MCNC: 130 MG/DL (ref 65–99)
GLUCOSE SERPL-MCNC: 139 MG/DL (ref 65–99)
GLUCOSE SERPL-MCNC: 140 MG/DL (ref 65–99)
GLUCOSE SERPL-MCNC: 141 MG/DL (ref 65–99)
GLUCOSE SERPL-MCNC: 163 MG/DL (ref 65–99)
GLUCOSE SERPL-MCNC: 173 MG/DL (ref 65–99)
GLUCOSE SERPL-MCNC: 182 MG/DL (ref 65–99)
GLUCOSE SERPL-MCNC: 194 MG/DL (ref 65–99)
GLUCOSE UR STRIP-MCNC: NEGATIVE MG/DL
HBA1C MFR BLD: 5.7 % (ref 3.5–5.6)
HCO3 BLDA-SCNC: 17.6 MMOL/L (ref 21–28)
HCO3 BLDA-SCNC: 19.7 MMOL/L (ref 21–28)
HCO3 BLDA-SCNC: 21.1 MMOL/L (ref 21–28)
HCO3 BLDA-SCNC: 21.3 MMOL/L (ref 21–28)
HCO3 BLDA-SCNC: 21.5 MMOL/L (ref 21–28)
HCO3 BLDA-SCNC: 21.7 MMOL/L (ref 21–28)
HCO3 BLDA-SCNC: 21.9 MMOL/L (ref 21–28)
HCO3 BLDA-SCNC: 22.3 MMOL/L (ref 21–28)
HCO3 BLDA-SCNC: 22.7 MMOL/L (ref 21–28)
HCO3 BLDA-SCNC: 22.8 MMOL/L (ref 22–26)
HCO3 BLDA-SCNC: 22.9 MMOL/L (ref 21–28)
HCO3 BLDA-SCNC: 23.8 MMOL/L (ref 22–26)
HCO3 BLDA-SCNC: 24.9 MMOL/L (ref 22–26)
HCO3 BLDA-SCNC: 25.4 MMOL/L (ref 22–26)
HCO3 BLDA-SCNC: 27.6 MMOL/L (ref 22–26)
HCO3 BLDA-SCNC: 28.7 MMOL/L (ref 22–26)
HCO3 BLDV-SCNC: 31.1 MMOL/L (ref 23–28)
HCT VFR BLD AUTO: 27.7 % (ref 37.5–51)
HCT VFR BLD AUTO: 29.5 % (ref 37.5–51)
HCT VFR BLD AUTO: 30.2 % (ref 37.5–51)
HCT VFR BLD AUTO: 31.8 % (ref 37.5–51)
HCT VFR BLD AUTO: 39.6 % (ref 37.5–51)
HCT VFR BLD AUTO: 40.8 % (ref 37.5–51)
HCT VFR BLD AUTO: 43.5 % (ref 37.5–51)
HCT VFR BLDA CALC: 26 % (ref 38–51)
HCT VFR BLDA CALC: 26 % (ref 38–51)
HCT VFR BLDA CALC: 28 % (ref 38–51)
HCT VFR BLDA CALC: 29 % (ref 38–51)
HCT VFR BLDA CALC: 30 % (ref 38–51)
HCT VFR BLDA CALC: 31 % (ref 38–51)
HCT VFR BLDA CALC: 32 % (ref 38–51)
HCT VFR BLDA CALC: 37 % (ref 38–51)
HCT VFR BLDA CALC: 43 % (ref 38–51)
HDLC SERPL-MCNC: 31 MG/DL (ref 40–60)
HEMODILUTION: NO
HEMODILUTION: YES
HGB BLD-MCNC: 10.4 G/DL (ref 13–17.7)
HGB BLD-MCNC: 10.6 G/DL (ref 13–17.7)
HGB BLD-MCNC: 13.1 G/DL (ref 13–17.7)
HGB BLD-MCNC: 13.8 G/DL (ref 13–17.7)
HGB BLD-MCNC: 14.3 G/DL (ref 13–17.7)
HGB BLD-MCNC: 9.2 G/DL (ref 13–17.7)
HGB BLD-MCNC: 9.7 G/DL (ref 13–17.7)
HGB BLDA-MCNC: 10.2 G/DL (ref 12–17)
HGB BLDA-MCNC: 10.2 G/DL (ref 12–17)
HGB BLDA-MCNC: 10.3 G/DL (ref 12–17)
HGB BLDA-MCNC: 10.6 G/DL (ref 12–17)
HGB BLDA-MCNC: 10.8 G/DL (ref 12–17)
HGB BLDA-MCNC: 12.6 G/DL (ref 12–17)
HGB BLDA-MCNC: 14.6 G/DL (ref 12–17)
HGB BLDA-MCNC: 8.8 G/DL (ref 12–17)
HGB BLDA-MCNC: 8.9 G/DL (ref 12–17)
HGB BLDA-MCNC: 9.5 G/DL (ref 12–17)
HGB BLDA-MCNC: 9.9 G/DL (ref 12–17)
HGB UR QL STRIP.AUTO: ABNORMAL
HYALINE CASTS UR QL AUTO: ABNORMAL /LPF
INHALED O2 CONCENTRATION: 100 %
INHALED O2 CONCENTRATION: 100 %
INHALED O2 CONCENTRATION: 40 %
INHALED O2 CONCENTRATION: 50 %
INHALED O2 CONCENTRATION: 60 %
INHALED O2 CONCENTRATION: 80 %
INR PPP: 1.02 (ref 0.93–1.1)
INR PPP: 1.04 (ref 0.93–1.1)
INR PPP: 1.05 (ref 0.93–1.1)
INR PPP: 1.17 (ref 0.93–1.1)
INR PPP: 1.18 (ref 0.93–1.1)
INR PPP: 1.19 (ref 0.93–1.1)
KETONES UR QL STRIP: NEGATIVE
LACTATE HOLD SPECIMEN: NORMAL
LDLC SERPL CALC-MCNC: 30 MG/DL (ref 0–100)
LDLC/HDLC SERPL: 0.99 {RATIO}
LEUKOCYTE ESTERASE UR QL STRIP.AUTO: ABNORMAL
LYMPHOCYTES # BLD AUTO: 1.5 10*3/MM3 (ref 0.7–3.1)
LYMPHOCYTES # BLD AUTO: 1.6 10*3/MM3 (ref 0.7–3.1)
LYMPHOCYTES # BLD AUTO: 1.6 10*3/MM3 (ref 0.7–3.1)
LYMPHOCYTES # BLD AUTO: 1.8 10*3/MM3 (ref 0.7–3.1)
LYMPHOCYTES # BLD AUTO: 2.1 10*3/MM3 (ref 0.7–3.1)
LYMPHOCYTES NFR BLD AUTO: 12.7 % (ref 19.6–45.3)
LYMPHOCYTES NFR BLD AUTO: 14.5 % (ref 19.6–45.3)
LYMPHOCYTES NFR BLD AUTO: 14.6 % (ref 19.6–45.3)
LYMPHOCYTES NFR BLD AUTO: 8.6 % (ref 19.6–45.3)
LYMPHOCYTES NFR BLD AUTO: 9.5 % (ref 19.6–45.3)
MAGNESIUM SERPL-MCNC: 2.1 MG/DL (ref 1.6–2.4)
MAGNESIUM SERPL-MCNC: 2.1 MG/DL (ref 1.6–2.4)
MAGNESIUM SERPL-MCNC: 2.3 MG/DL (ref 1.6–2.4)
MAGNESIUM SERPL-MCNC: 2.9 MG/DL (ref 1.6–2.4)
MAGNESIUM SERPL-MCNC: 3 MG/DL (ref 1.6–2.4)
MAGNESIUM SERPL-MCNC: 3 MG/DL (ref 1.6–2.4)
MCH RBC QN AUTO: 28.3 PG (ref 26.6–33)
MCH RBC QN AUTO: 28.9 PG (ref 26.6–33)
MCH RBC QN AUTO: 29 PG (ref 26.6–33)
MCH RBC QN AUTO: 29 PG (ref 26.6–33)
MCH RBC QN AUTO: 29.1 PG (ref 26.6–33)
MCH RBC QN AUTO: 29.2 PG (ref 26.6–33)
MCH RBC QN AUTO: 30.1 PG (ref 26.6–33)
MCHC RBC AUTO-ENTMCNC: 32.8 G/DL (ref 31.5–35.7)
MCHC RBC AUTO-ENTMCNC: 32.8 G/DL (ref 31.5–35.7)
MCHC RBC AUTO-ENTMCNC: 33.2 G/DL (ref 31.5–35.7)
MCHC RBC AUTO-ENTMCNC: 33.3 G/DL (ref 31.5–35.7)
MCHC RBC AUTO-ENTMCNC: 33.4 G/DL (ref 31.5–35.7)
MCHC RBC AUTO-ENTMCNC: 33.9 G/DL (ref 31.5–35.7)
MCHC RBC AUTO-ENTMCNC: 34.4 G/DL (ref 31.5–35.7)
MCV RBC AUTO: 86 FL (ref 79–97)
MCV RBC AUTO: 86.3 FL (ref 79–97)
MCV RBC AUTO: 86.9 FL (ref 79–97)
MCV RBC AUTO: 87 FL (ref 79–97)
MCV RBC AUTO: 87.3 FL (ref 79–97)
MCV RBC AUTO: 87.6 FL (ref 79–97)
MCV RBC AUTO: 88.5 FL (ref 79–97)
MODALITY: ABNORMAL
MONOCYTES # BLD AUTO: 1 10*3/MM3 (ref 0.1–0.9)
MONOCYTES # BLD AUTO: 1 10*3/MM3 (ref 0.1–0.9)
MONOCYTES # BLD AUTO: 1.1 10*3/MM3 (ref 0.1–0.9)
MONOCYTES # BLD AUTO: 1.6 10*3/MM3 (ref 0.1–0.9)
MONOCYTES # BLD AUTO: 2.1 10*3/MM3 (ref 0.1–0.9)
MONOCYTES NFR BLD AUTO: 13.2 % (ref 5–12)
MONOCYTES NFR BLD AUTO: 7.7 % (ref 5–12)
MONOCYTES NFR BLD AUTO: 7.9 % (ref 5–12)
MONOCYTES NFR BLD AUTO: 7.9 % (ref 5–12)
MONOCYTES NFR BLD AUTO: 8.9 % (ref 5–12)
NEUTROPHILS NFR BLD AUTO: 10.8 10*3/MM3 (ref 1.7–7)
NEUTROPHILS NFR BLD AUTO: 12.4 10*3/MM3 (ref 1.7–7)
NEUTROPHILS NFR BLD AUTO: 17.7 10*3/MM3 (ref 1.7–7)
NEUTROPHILS NFR BLD AUTO: 75.1 % (ref 42.7–76)
NEUTROPHILS NFR BLD AUTO: 76.3 % (ref 42.7–76)
NEUTROPHILS NFR BLD AUTO: 77.1 % (ref 42.7–76)
NEUTROPHILS NFR BLD AUTO: 78 % (ref 42.7–76)
NEUTROPHILS NFR BLD AUTO: 8.5 10*3/MM3 (ref 1.7–7)
NEUTROPHILS NFR BLD AUTO: 83.6 % (ref 42.7–76)
NEUTROPHILS NFR BLD AUTO: 9.8 10*3/MM3 (ref 1.7–7)
NITRITE UR QL STRIP: NEGATIVE
NRBC BLD AUTO-RTO: 0 /100 WBC (ref 0–0.2)
NRBC BLD AUTO-RTO: 0.1 /100 WBC (ref 0–0.2)
NT-PROBNP SERPL-MCNC: 4896 PG/ML (ref 0–900)
NT-PROBNP SERPL-MCNC: 9784 PG/ML (ref 0–900)
PCO2 BLDA: 31.7 MM HG (ref 35–48)
PCO2 BLDA: 32.7 MM HG (ref 35–48)
PCO2 BLDA: 34.3 MM HG (ref 35–48)
PCO2 BLDA: 37 MM HG (ref 35–48)
PCO2 BLDA: 37 MM HG (ref 35–48)
PCO2 BLDA: 37.2 MM HG (ref 35–48)
PCO2 BLDA: 38.3 MM HG (ref 35–48)
PCO2 BLDA: 39.6 MM HG (ref 35–48)
PCO2 BLDA: 39.6 MM HG (ref 35–48)
PCO2 BLDA: 41.5 MM HG (ref 35–48)
PCO2 BLDA: 42.6 MM HG (ref 35–45)
PCO2 BLDA: 45.4 MM HG (ref 35–45)
PCO2 BLDA: 47 MM HG (ref 35–45)
PCO2 BLDA: 48.4 MM HG (ref 35–45)
PCO2 BLDA: 49 MM HG (ref 35–45)
PCO2 BLDA: 53.4 MM HG (ref 35–45)
PCO2 BLDV: 60.5 MM HG (ref 41–51)
PEEP RESPIRATORY: 5 CM[H2O]
PEEP RESPIRATORY: 8 CM[H2O]
PH BLDA: 7.28 PH UNITS (ref 7.35–7.45)
PH BLDA: 7.29 PH UNITS (ref 7.35–7.45)
PH BLDA: 7.35 PH UNITS (ref 7.35–7.45)
PH BLDA: 7.36 PH UNITS (ref 7.35–7.45)
PH BLDA: 7.37 PH UNITS (ref 7.35–7.45)
PH BLDA: 7.37 PH UNITS (ref 7.35–7.45)
PH BLDA: 7.38 PH UNITS (ref 7.35–7.45)
PH BLDA: 7.39 PH UNITS (ref 7.35–7.45)
PH BLDA: 7.44 PH UNITS (ref 7.35–7.45)
PH BLDA: 7.45 PH UNITS (ref 7.35–7.45)
PH BLDV: 7.32 PH UNITS (ref 7.31–7.41)
PH UR STRIP.AUTO: <=5 [PH] (ref 5–8)
PHOSPHATE SERPL-MCNC: 2.6 MG/DL (ref 2.5–4.5)
PHOSPHATE SERPL-MCNC: 2.7 MG/DL (ref 2.5–4.5)
PHOSPHATE SERPL-MCNC: 4.2 MG/DL (ref 2.5–4.5)
PLATELET # BLD AUTO: 191 10*3/MM3 (ref 140–450)
PLATELET # BLD AUTO: 207 10*3/MM3 (ref 140–450)
PLATELET # BLD AUTO: 224 10*3/MM3 (ref 140–450)
PLATELET # BLD AUTO: 226 10*3/MM3 (ref 140–450)
PLATELET # BLD AUTO: 238 10*3/MM3 (ref 140–450)
PLATELET # BLD AUTO: 238 10*3/MM3 (ref 140–450)
PLATELET # BLD AUTO: 239 10*3/MM3 (ref 140–450)
PLATELET # BLD AUTO: 261 10*3/MM3 (ref 140–450)
PMV BLD AUTO: 7.7 FL (ref 6–12)
PMV BLD AUTO: 7.9 FL (ref 6–12)
PMV BLD AUTO: 8.2 FL (ref 6–12)
PMV BLD AUTO: 8.4 FL (ref 6–12)
PMV BLD AUTO: 8.5 FL (ref 6–12)
PO2 BLDA: 101.8 MM HG (ref 83–108)
PO2 BLDA: 103.6 MM HG (ref 83–108)
PO2 BLDA: 107.7 MM HG (ref 83–108)
PO2 BLDA: 107.7 MM HG (ref 83–108)
PO2 BLDA: 112 MM HG (ref 80–105)
PO2 BLDA: 232 MM HG (ref 80–105)
PO2 BLDA: 379 MM HG (ref 80–105)
PO2 BLDA: 424 MM HG (ref 80–105)
PO2 BLDA: 444 MM HG (ref 80–105)
PO2 BLDA: 448 MM HG (ref 80–105)
PO2 BLDA: 74.7 MM HG (ref 83–108)
PO2 BLDA: 90 MM HG (ref 83–108)
PO2 BLDA: 91.8 MM HG (ref 83–108)
PO2 BLDA: 93.8 MM HG (ref 83–108)
PO2 BLDA: 95.3 MM HG (ref 83–108)
PO2 BLDA: 96.2 MM HG (ref 83–108)
PO2 BLDV: 56 MM HG
POTASSIUM BLDA-SCNC: 3.2 MMOL/L (ref 3.5–4.5)
POTASSIUM BLDA-SCNC: 3.3 MMOL/L (ref 3.5–4.5)
POTASSIUM BLDA-SCNC: 3.3 MMOL/L (ref 3.5–4.5)
POTASSIUM BLDA-SCNC: 3.4 MMOL/L (ref 3.5–4.5)
POTASSIUM BLDA-SCNC: 3.5 MMOL/L (ref 3.5–4.5)
POTASSIUM BLDA-SCNC: 3.7 MMOL/L (ref 3.5–4.5)
POTASSIUM BLDA-SCNC: 3.9 MMOL/L (ref 3.5–4.9)
POTASSIUM BLDA-SCNC: 4.2 MMOL/L (ref 3.5–4.9)
POTASSIUM BLDA-SCNC: 4.3 MMOL/L (ref 3.5–4.9)
POTASSIUM BLDA-SCNC: 4.7 MMOL/L (ref 3.5–4.9)
POTASSIUM BLDA-SCNC: 4.7 MMOL/L (ref 3.5–4.9)
POTASSIUM BLDA-SCNC: 4.8 MMOL/L (ref 3.5–4.9)
POTASSIUM BLDA-SCNC: 4.9 MMOL/L (ref 3.5–4.9)
POTASSIUM SERPL-SCNC: 3.4 MMOL/L (ref 3.5–5.2)
POTASSIUM SERPL-SCNC: 3.8 MMOL/L (ref 3.5–5.2)
POTASSIUM SERPL-SCNC: 3.9 MMOL/L (ref 3.5–5.2)
POTASSIUM SERPL-SCNC: 3.9 MMOL/L (ref 3.5–5.2)
POTASSIUM SERPL-SCNC: 4 MMOL/L (ref 3.5–5.2)
POTASSIUM SERPL-SCNC: 4 MMOL/L (ref 3.5–5.2)
POTASSIUM SERPL-SCNC: 4.1 MMOL/L (ref 3.5–5.2)
PROCALCITONIN SERPL-MCNC: 0.07 NG/ML (ref 0–0.25)
PROT SERPL-MCNC: 6.8 G/DL (ref 6–8.5)
PROT UR QL STRIP: ABNORMAL
PROTHROMBIN TIME: 11.2 SECONDS (ref 9.6–11.7)
PROTHROMBIN TIME: 11.4 SECONDS (ref 9.6–11.7)
PROTHROMBIN TIME: 11.5 SECONDS (ref 9.6–11.7)
PROTHROMBIN TIME: 12.8 SECONDS (ref 9.6–11.7)
PROTHROMBIN TIME: 12.9 SECONDS (ref 9.6–11.7)
PROTHROMBIN TIME: 13 SECONDS (ref 9.6–11.7)
QT INTERVAL: 336 MS
QT INTERVAL: 352 MS
QT INTERVAL: 354 MS
QT INTERVAL: 376 MS
QT INTERVAL: 423 MS
RBC # BLD AUTO: 3.16 10*6/MM3 (ref 4.14–5.8)
RBC # BLD AUTO: 3.42 10*6/MM3 (ref 4.14–5.8)
RBC # BLD AUTO: 3.46 10*6/MM3 (ref 4.14–5.8)
RBC # BLD AUTO: 3.66 10*6/MM3 (ref 4.14–5.8)
RBC # BLD AUTO: 4.55 10*6/MM3 (ref 4.14–5.8)
RBC # BLD AUTO: 4.75 10*6/MM3 (ref 4.14–5.8)
RBC # BLD AUTO: 4.92 10*6/MM3 (ref 4.14–5.8)
RBC # UR: ABNORMAL /HPF
REF LAB TEST METHOD: ABNORMAL
RESPIRATORY RATE: 16
RESPIRATORY RATE: 24
RH BLD: POSITIVE
SAO2 % BLDCOA: 100 % (ref 95–98)
SAO2 % BLDCOA: 94.4 % (ref 94–98)
SAO2 % BLDCOA: 96.3 % (ref 94–98)
SAO2 % BLDCOA: 96.5 % (ref 94–98)
SAO2 % BLDCOA: 96.9 % (ref 94–98)
SAO2 % BLDCOA: 97.3 % (ref 94–98)
SAO2 % BLDCOA: 97.5 % (ref 94–98)
SAO2 % BLDCOA: 97.7 % (ref 94–98)
SAO2 % BLDCOA: 97.8 % (ref 94–98)
SAO2 % BLDCOA: 98 % (ref 94–98)
SAO2 % BLDCOA: 98 % (ref 95–98)
SAO2 % BLDCOA: 98.5 % (ref 94–98)
SAO2 % BLDCOV: 33 %
SARS-COV-2 RNA PNL SPEC NAA+PROBE: NORMAL
SODIUM BLD-SCNC: 132 MMOL/L (ref 138–146)
SODIUM BLD-SCNC: 137 MMOL/L (ref 138–146)
SODIUM BLD-SCNC: 137 MMOL/L (ref 138–146)
SODIUM BLD-SCNC: 138 MMOL/L (ref 138–146)
SODIUM BLD-SCNC: 139 MMOL/L (ref 138–146)
SODIUM BLD-SCNC: 141 MMOL/L (ref 138–146)
SODIUM BLD-SCNC: 141 MMOL/L (ref 138–146)
SODIUM BLD-SCNC: 143 MMOL/L (ref 138–146)
SODIUM BLD-SCNC: 145 MMOL/L (ref 138–146)
SODIUM SERPL-SCNC: 135 MMOL/L (ref 136–145)
SODIUM SERPL-SCNC: 136 MMOL/L (ref 136–145)
SODIUM SERPL-SCNC: 137 MMOL/L (ref 136–145)
SODIUM SERPL-SCNC: 139 MMOL/L (ref 136–145)
SODIUM SERPL-SCNC: 142 MMOL/L (ref 136–145)
SODIUM SERPL-SCNC: 142 MMOL/L (ref 136–145)
SODIUM SERPL-SCNC: 143 MMOL/L (ref 136–145)
SODIUM SERPL-SCNC: 144 MMOL/L (ref 136–145)
SP GR UR STRIP: 1.02 (ref 1–1.03)
SQUAMOUS #/AREA URNS HPF: ABNORMAL /HPF
T&S EXPIRATION DATE: NORMAL
TRIGL SERPL-MCNC: 86 MG/DL (ref 0–150)
TROPONIN T SERPL-MCNC: 1.53 NG/ML (ref 0–0.03)
TROPONIN T SERPL-MCNC: 1.59 NG/ML (ref 0–0.03)
TROPONIN T SERPL-MCNC: 1.74 NG/ML (ref 0–0.03)
UNIT  ABO: NORMAL
UNIT  RH: NORMAL
UROBILINOGEN UR QL STRIP: ABNORMAL
VENTILATOR MODE: ABNORMAL
VLDLC SERPL-MCNC: 18 MG/DL (ref 5–40)
VT ON VENT VENT: 550 ML
VT ON VENT VENT: 600 ML
VT ON VENT VENT: 700 ML
WBC # BLD AUTO: 11.3 10*3/MM3 (ref 3.4–10.8)
WBC # BLD AUTO: 12.6 10*3/MM3 (ref 3.4–10.8)
WBC # BLD AUTO: 14.1 10*3/MM3 (ref 3.4–10.8)
WBC # BLD AUTO: 16.1 10*3/MM3 (ref 3.4–10.8)
WBC # BLD AUTO: 21.1 10*3/MM3 (ref 3.4–10.8)
WBC # BLD AUTO: 21.2 10*3/MM3 (ref 3.4–10.8)
WBC # BLD AUTO: 22.2 10*3/MM3 (ref 3.4–10.8)
WBC UR QL AUTO: ABNORMAL /HPF

## 2020-01-01 PROCEDURE — 82330 ASSAY OF CALCIUM: CPT

## 2020-01-01 PROCEDURE — 33533 CABG ARTERIAL SINGLE: CPT | Performed by: PHYSICIAN ASSISTANT

## 2020-01-01 PROCEDURE — 25010000002 HEPARIN (PORCINE) 25000-0.45 UT/250ML-% SOLUTION: Performed by: INTERNAL MEDICINE

## 2020-01-01 PROCEDURE — 85730 THROMBOPLASTIN TIME PARTIAL: CPT | Performed by: NURSE PRACTITIONER

## 2020-01-01 PROCEDURE — 25010000002 PAPAVERINE PER 60 MG: Performed by: THORACIC SURGERY (CARDIOTHORACIC VASCULAR SURGERY)

## 2020-01-01 PROCEDURE — 85347 COAGULATION TIME ACTIVATED: CPT

## 2020-01-01 PROCEDURE — 93005 ELECTROCARDIOGRAM TRACING: CPT | Performed by: INTERNAL MEDICINE

## 2020-01-01 PROCEDURE — 82330 ASSAY OF CALCIUM: CPT | Performed by: THORACIC SURGERY (CARDIOTHORACIC VASCULAR SURGERY)

## 2020-01-01 PROCEDURE — 25010000002 EPINEPHRINE 1 MG/ML SOLUTION 30 ML VIAL: Performed by: ANESTHESIOLOGY

## 2020-01-01 PROCEDURE — 86900 BLOOD TYPING SEROLOGIC ABO: CPT | Performed by: NURSE PRACTITIONER

## 2020-01-01 PROCEDURE — 81001 URINALYSIS AUTO W/SCOPE: CPT | Performed by: NURSE PRACTITIONER

## 2020-01-01 PROCEDURE — 85610 PROTHROMBIN TIME: CPT | Performed by: NURSE PRACTITIONER

## 2020-01-01 PROCEDURE — 94799 UNLISTED PULMONARY SVC/PX: CPT

## 2020-01-01 PROCEDURE — 25010000002 ALBUMIN HUMAN 5% PER 50 ML: Performed by: THORACIC SURGERY (CARDIOTHORACIC VASCULAR SURGERY)

## 2020-01-01 PROCEDURE — 99024 POSTOP FOLLOW-UP VISIT: CPT | Performed by: NURSE PRACTITIONER

## 2020-01-01 PROCEDURE — 87077 CULTURE AEROBIC IDENTIFY: CPT | Performed by: NURSE PRACTITIONER

## 2020-01-01 PROCEDURE — 33533 CABG ARTERIAL SINGLE: CPT | Performed by: THORACIC SURGERY (CARDIOTHORACIC VASCULAR SURGERY)

## 2020-01-01 PROCEDURE — 85730 THROMBOPLASTIN TIME PARTIAL: CPT | Performed by: INTERNAL MEDICINE

## 2020-01-01 PROCEDURE — 33426 REPAIR OF MITRAL VALVE: CPT | Performed by: PHYSICIAN ASSISTANT

## 2020-01-01 PROCEDURE — 85730 THROMBOPLASTIN TIME PARTIAL: CPT | Performed by: THORACIC SURGERY (CARDIOTHORACIC VASCULAR SURGERY)

## 2020-01-01 PROCEDURE — 25010000002 FUROSEMIDE PER 20 MG: Performed by: INTERNAL MEDICINE

## 2020-01-01 PROCEDURE — 85025 COMPLETE CBC W/AUTO DIFF WBC: CPT | Performed by: NURSE PRACTITIONER

## 2020-01-01 PROCEDURE — 25010000002 METOCLOPRAMIDE PER 10 MG: Performed by: THORACIC SURGERY (CARDIOTHORACIC VASCULAR SURGERY)

## 2020-01-01 PROCEDURE — 33508 ENDOSCOPIC VEIN HARVEST: CPT | Performed by: PHYSICIAN ASSISTANT

## 2020-01-01 PROCEDURE — 82962 GLUCOSE BLOOD TEST: CPT

## 2020-01-01 PROCEDURE — 83036 HEMOGLOBIN GLYCOSYLATED A1C: CPT | Performed by: NURSE PRACTITIONER

## 2020-01-01 PROCEDURE — 06BQ4ZZ EXCISION OF LEFT SAPHENOUS VEIN, PERCUTANEOUS ENDOSCOPIC APPROACH: ICD-10-PCS | Performed by: THORACIC SURGERY (CARDIOTHORACIC VASCULAR SURGERY)

## 2020-01-01 PROCEDURE — 87070 CULTURE OTHR SPECIMN AEROBIC: CPT | Performed by: NURSE PRACTITIONER

## 2020-01-01 PROCEDURE — 33522 CABG ARTERY-VEIN FIVE: CPT | Performed by: PHYSICIAN ASSISTANT

## 2020-01-01 PROCEDURE — C1713 ANCHOR/SCREW BN/BN,TIS/BN: HCPCS | Performed by: THORACIC SURGERY (CARDIOTHORACIC VASCULAR SURGERY)

## 2020-01-01 PROCEDURE — 25010000002 ALBUMIN HUMAN 5% PER 50 ML: Performed by: NURSE PRACTITIONER

## 2020-01-01 PROCEDURE — A4648 IMPLANTABLE TISSUE MARKER: HCPCS | Performed by: THORACIC SURGERY (CARDIOTHORACIC VASCULAR SURGERY)

## 2020-01-01 PROCEDURE — C1751 CATH, INF, PER/CENT/MIDLINE: HCPCS | Performed by: ANESTHESIOLOGY

## 2020-01-01 PROCEDURE — 25010000002 MORPHINE PER 10 MG: Performed by: THORACIC SURGERY (CARDIOTHORACIC VASCULAR SURGERY)

## 2020-01-01 PROCEDURE — 83880 ASSAY OF NATRIURETIC PEPTIDE: CPT | Performed by: NURSE PRACTITIONER

## 2020-01-01 PROCEDURE — 93010 ELECTROCARDIOGRAM REPORT: CPT | Performed by: INTERNAL MEDICINE

## 2020-01-01 PROCEDURE — 93970 EXTREMITY STUDY: CPT

## 2020-01-01 PROCEDURE — 93306 TTE W/DOPPLER COMPLETE: CPT

## 2020-01-01 PROCEDURE — 86923 COMPATIBILITY TEST ELECTRIC: CPT

## 2020-01-01 PROCEDURE — 83735 ASSAY OF MAGNESIUM: CPT | Performed by: THORACIC SURGERY (CARDIOTHORACIC VASCULAR SURGERY)

## 2020-01-01 PROCEDURE — 0BH17EZ INSERTION OF ENDOTRACHEAL AIRWAY INTO TRACHEA, VIA NATURAL OR ARTIFICIAL OPENING: ICD-10-PCS | Performed by: INTERNAL MEDICINE

## 2020-01-01 PROCEDURE — 86900 BLOOD TYPING SEROLOGIC ABO: CPT

## 2020-01-01 PROCEDURE — 85025 COMPLETE CBC W/AUTO DIFF WBC: CPT | Performed by: INTERNAL MEDICINE

## 2020-01-01 PROCEDURE — C1894 INTRO/SHEATH, NON-LASER: HCPCS | Performed by: INTERNAL MEDICINE

## 2020-01-01 PROCEDURE — 99152 MOD SED SAME PHYS/QHP 5/>YRS: CPT | Performed by: INTERNAL MEDICINE

## 2020-01-01 PROCEDURE — C1729 CATH, DRAINAGE: HCPCS | Performed by: THORACIC SURGERY (CARDIOTHORACIC VASCULAR SURGERY)

## 2020-01-01 PROCEDURE — 25010000002 AMIODARONE PER 30 MG: Performed by: INTERNAL MEDICINE

## 2020-01-01 PROCEDURE — 25010000003 CEFAZOLIN PER 500 MG: Performed by: ANESTHESIOLOGY

## 2020-01-01 PROCEDURE — 25010000002 CALCIUM GLUCONATE PER 10 ML: Performed by: ANESTHESIOLOGY

## 2020-01-01 PROCEDURE — 85049 AUTOMATED PLATELET COUNT: CPT | Performed by: THORACIC SURGERY (CARDIOTHORACIC VASCULAR SURGERY)

## 2020-01-01 PROCEDURE — 85018 HEMOGLOBIN: CPT

## 2020-01-01 PROCEDURE — 93458 L HRT ARTERY/VENTRICLE ANGIO: CPT | Performed by: INTERNAL MEDICINE

## 2020-01-01 PROCEDURE — 84484 ASSAY OF TROPONIN QUANT: CPT | Performed by: INTERNAL MEDICINE

## 2020-01-01 PROCEDURE — 84132 ASSAY OF SERUM POTASSIUM: CPT | Performed by: THORACIC SURGERY (CARDIOTHORACIC VASCULAR SURGERY)

## 2020-01-01 PROCEDURE — 85384 FIBRINOGEN ACTIVITY: CPT | Performed by: THORACIC SURGERY (CARDIOTHORACIC VASCULAR SURGERY)

## 2020-01-01 PROCEDURE — 94640 AIRWAY INHALATION TREATMENT: CPT

## 2020-01-01 PROCEDURE — 82947 ASSAY GLUCOSE BLOOD QUANT: CPT

## 2020-01-01 PROCEDURE — 99220 PR INITIAL OBSERVATION CARE/DAY 70 MINUTES: CPT | Performed by: NURSE PRACTITIONER

## 2020-01-01 PROCEDURE — 33426 REPAIR OF MITRAL VALVE: CPT | Performed by: THORACIC SURGERY (CARDIOTHORACIC VASCULAR SURGERY)

## 2020-01-01 PROCEDURE — 86850 RBC ANTIBODY SCREEN: CPT | Performed by: NURSE PRACTITIONER

## 2020-01-01 PROCEDURE — 25010000002 AMIODARONE IN DEXTROSE 5% 150-4.21 MG/100ML-% SOLUTION: Performed by: INTERNAL MEDICINE

## 2020-01-01 PROCEDURE — 82803 BLOOD GASES ANY COMBINATION: CPT

## 2020-01-01 PROCEDURE — 85610 PROTHROMBIN TIME: CPT | Performed by: THORACIC SURGERY (CARDIOTHORACIC VASCULAR SURGERY)

## 2020-01-01 PROCEDURE — 25010000002 SULFUR HEXAFLUORIDE MICROSPH 60.7-25 MG RECONSTITUTED SUSPENSION: Performed by: INTERNAL MEDICINE

## 2020-01-01 PROCEDURE — 80061 LIPID PANEL: CPT | Performed by: INTERNAL MEDICINE

## 2020-01-01 PROCEDURE — 25010000002 FENTANYL CITRATE (PF) 250 MCG/5ML SOLUTION: Performed by: ANESTHESIOLOGY

## 2020-01-01 PROCEDURE — 63710000001 INSULIN REGULAR HUMAN PER 5 UNITS: Performed by: ANESTHESIOLOGY

## 2020-01-01 PROCEDURE — 99233 SBSQ HOSP IP/OBS HIGH 50: CPT | Performed by: INTERNAL MEDICINE

## 2020-01-01 PROCEDURE — 74018 RADEX ABDOMEN 1 VIEW: CPT

## 2020-01-01 PROCEDURE — 25010000002 MAGNESIUM SULFATE PER 500 MG OF MAGNESIUM: Performed by: ANESTHESIOLOGY

## 2020-01-01 PROCEDURE — 25010000002 MAGNESIUM SULFATE 2 GM/50ML SOLUTION: Performed by: INTERNAL MEDICINE

## 2020-01-01 PROCEDURE — B2111ZZ FLUOROSCOPY OF MULTIPLE CORONARY ARTERIES USING LOW OSMOLAR CONTRAST: ICD-10-PCS | Performed by: INTERNAL MEDICINE

## 2020-01-01 PROCEDURE — 25010000002 AMIODARONE IN DEXTROSE 5% 150-4.21 MG/100ML-% SOLUTION: Performed by: THORACIC SURGERY (CARDIOTHORACIC VASCULAR SURGERY)

## 2020-01-01 PROCEDURE — 84484 ASSAY OF TROPONIN QUANT: CPT | Performed by: NURSE PRACTITIONER

## 2020-01-01 PROCEDURE — P9100 PATHOGEN TEST FOR PLATELETS: HCPCS

## 2020-01-01 PROCEDURE — 83605 ASSAY OF LACTIC ACID: CPT

## 2020-01-01 PROCEDURE — 85610 PROTHROMBIN TIME: CPT | Performed by: INTERNAL MEDICINE

## 2020-01-01 PROCEDURE — 82728 ASSAY OF FERRITIN: CPT | Performed by: NURSE PRACTITIONER

## 2020-01-01 PROCEDURE — 80048 BASIC METABOLIC PNL TOTAL CA: CPT | Performed by: NURSE PRACTITIONER

## 2020-01-01 PROCEDURE — 5A02210 ASSISTANCE WITH CARDIAC OUTPUT USING BALLOON PUMP, CONTINUOUS: ICD-10-PCS | Performed by: INTERNAL MEDICINE

## 2020-01-01 PROCEDURE — 25010000002 ENOXAPARIN PER 10 MG: Performed by: NURSE PRACTITIONER

## 2020-01-01 PROCEDURE — 71045 X-RAY EXAM CHEST 1 VIEW: CPT

## 2020-01-01 PROCEDURE — 25010000002 FUROSEMIDE PER 20 MG: Performed by: NURSE PRACTITIONER

## 2020-01-01 PROCEDURE — 63710000001 INSULIN REGULAR HUMAN PER 5 UNITS: Performed by: THORACIC SURGERY (CARDIOTHORACIC VASCULAR SURGERY)

## 2020-01-01 PROCEDURE — 86901 BLOOD TYPING SEROLOGIC RH(D): CPT | Performed by: NURSE PRACTITIONER

## 2020-01-01 PROCEDURE — C1769 GUIDE WIRE: HCPCS | Performed by: INTERNAL MEDICINE

## 2020-01-01 PROCEDURE — 80051 ELECTROLYTE PANEL: CPT

## 2020-01-01 PROCEDURE — 85576 BLOOD PLATELET AGGREGATION: CPT | Performed by: NURSE PRACTITIONER

## 2020-01-01 PROCEDURE — 83735 ASSAY OF MAGNESIUM: CPT | Performed by: INTERNAL MEDICINE

## 2020-01-01 PROCEDURE — 94003 VENT MGMT INPAT SUBQ DAY: CPT

## 2020-01-01 PROCEDURE — 84145 PROCALCITONIN (PCT): CPT | Performed by: NURSE PRACTITIONER

## 2020-01-01 PROCEDURE — 36430 TRANSFUSION BLD/BLD COMPNT: CPT

## 2020-01-01 PROCEDURE — 83735 ASSAY OF MAGNESIUM: CPT | Performed by: NURSE PRACTITIONER

## 2020-01-01 PROCEDURE — 0 IOPAMIDOL PER 1 ML: Performed by: INTERNAL MEDICINE

## 2020-01-01 PROCEDURE — 99153 MOD SED SAME PHYS/QHP EA: CPT | Performed by: INTERNAL MEDICINE

## 2020-01-01 PROCEDURE — G0378 HOSPITAL OBSERVATION PER HR: HCPCS

## 2020-01-01 PROCEDURE — 25010000002 EPINEPHRINE 30 MG/30ML SOLUTION 30 ML VIAL: Performed by: THORACIC SURGERY (CARDIOTHORACIC VASCULAR SURGERY)

## 2020-01-01 PROCEDURE — 25010000003 MILRINONE LACTATE IN DEXTROSE 20-5 MG/100ML-% SOLUTION: Performed by: ANESTHESIOLOGY

## 2020-01-01 PROCEDURE — 25010000002 MAGNESIUM SULFATE IN D5W 1G/100ML (PREMIX) 1-5 GM/100ML-% SOLUTION: Performed by: THORACIC SURGERY (CARDIOTHORACIC VASCULAR SURGERY)

## 2020-01-01 PROCEDURE — 85027 COMPLETE CBC AUTOMATED: CPT | Performed by: THORACIC SURGERY (CARDIOTHORACIC VASCULAR SURGERY)

## 2020-01-01 PROCEDURE — 25010000002 PHENYLEPHRINE 10 MG/ML SOLUTION: Performed by: THORACIC SURGERY (CARDIOTHORACIC VASCULAR SURGERY)

## 2020-01-01 PROCEDURE — 02100Z9 BYPASS CORONARY ARTERY, ONE ARTERY FROM LEFT INTERNAL MAMMARY, OPEN APPROACH: ICD-10-PCS | Performed by: THORACIC SURGERY (CARDIOTHORACIC VASCULAR SURGERY)

## 2020-01-01 PROCEDURE — 03HY32Z INSERTION OF MONITORING DEVICE INTO UPPER ARTERY, PERCUTANEOUS APPROACH: ICD-10-PCS | Performed by: INTERNAL MEDICINE

## 2020-01-01 PROCEDURE — 25010000002 MIDAZOLAM PER 1 MG: Performed by: ANESTHESIOLOGY

## 2020-01-01 PROCEDURE — 25010000002 FENTANYL CITRATE (PF) 100 MCG/2ML SOLUTION: Performed by: INTERNAL MEDICINE

## 2020-01-01 PROCEDURE — 25010000002 CEFAZOLIN PER 500 MG: Performed by: THORACIC SURGERY (CARDIOTHORACIC VASCULAR SURGERY)

## 2020-01-01 PROCEDURE — 02UG08Z SUPPLEMENT MITRAL VALVE WITH ZOOPLASTIC TISSUE, OPEN APPROACH: ICD-10-PCS | Performed by: THORACIC SURGERY (CARDIOTHORACIC VASCULAR SURGERY)

## 2020-01-01 PROCEDURE — 87205 SMEAR GRAM STAIN: CPT | Performed by: NURSE PRACTITIONER

## 2020-01-01 PROCEDURE — 33967 INSERT I-AORT PERCUT DEVICE: CPT | Performed by: INTERNAL MEDICINE

## 2020-01-01 PROCEDURE — 5A1945Z RESPIRATORY VENTILATION, 24-96 CONSECUTIVE HOURS: ICD-10-PCS | Performed by: INTERNAL MEDICINE

## 2020-01-01 PROCEDURE — P9041 ALBUMIN (HUMAN),5%, 50ML: HCPCS | Performed by: THORACIC SURGERY (CARDIOTHORACIC VASCULAR SURGERY)

## 2020-01-01 PROCEDURE — 021309W BYPASS CORONARY ARTERY, FOUR OR MORE ARTERIES FROM AORTA WITH AUTOLOGOUS VENOUS TISSUE, OPEN APPROACH: ICD-10-PCS | Performed by: THORACIC SURGERY (CARDIOTHORACIC VASCULAR SURGERY)

## 2020-01-01 PROCEDURE — 85025 COMPLETE CBC W/AUTO DIFF WBC: CPT | Performed by: THORACIC SURGERY (CARDIOTHORACIC VASCULAR SURGERY)

## 2020-01-01 PROCEDURE — 25010000002 AMIODARONE PER 30 MG: Performed by: THORACIC SURGERY (CARDIOTHORACIC VASCULAR SURGERY)

## 2020-01-01 PROCEDURE — 84295 ASSAY OF SERUM SODIUM: CPT

## 2020-01-01 PROCEDURE — 87086 URINE CULTURE/COLONY COUNT: CPT | Performed by: NURSE PRACTITIONER

## 2020-01-01 PROCEDURE — 93880 EXTRACRANIAL BILAT STUDY: CPT

## 2020-01-01 PROCEDURE — P9041 ALBUMIN (HUMAN),5%, 50ML: HCPCS | Performed by: ANESTHESIOLOGY

## 2020-01-01 PROCEDURE — 84132 ASSAY OF SERUM POTASSIUM: CPT

## 2020-01-01 PROCEDURE — 25010000002 LORAZEPAM PER 2 MG: Performed by: INTERNAL MEDICINE

## 2020-01-01 PROCEDURE — 82330 ASSAY OF CALCIUM: CPT | Performed by: NURSE PRACTITIONER

## 2020-01-01 PROCEDURE — 25010000003 POTASSIUM CHLORIDE 10 MEQ/100ML SOLUTION: Performed by: THORACIC SURGERY (CARDIOTHORACIC VASCULAR SURGERY)

## 2020-01-01 PROCEDURE — 86901 BLOOD TYPING SEROLOGIC RH(D): CPT

## 2020-01-01 PROCEDURE — 80069 RENAL FUNCTION PANEL: CPT | Performed by: THORACIC SURGERY (CARDIOTHORACIC VASCULAR SURGERY)

## 2020-01-01 PROCEDURE — 25010000002 MIDAZOLAM PER 1 MG: Performed by: INTERNAL MEDICINE

## 2020-01-01 PROCEDURE — 99223 1ST HOSP IP/OBS HIGH 75: CPT | Performed by: INTERNAL MEDICINE

## 2020-01-01 PROCEDURE — 93005 ELECTROCARDIOGRAM TRACING: CPT

## 2020-01-01 PROCEDURE — 93005 ELECTROCARDIOGRAM TRACING: CPT | Performed by: THORACIC SURGERY (CARDIOTHORACIC VASCULAR SURGERY)

## 2020-01-01 PROCEDURE — 87635 SARS-COV-2 COVID-19 AMP PRB: CPT | Performed by: NURSE PRACTITIONER

## 2020-01-01 PROCEDURE — 93306 TTE W/DOPPLER COMPLETE: CPT | Performed by: INTERNAL MEDICINE

## 2020-01-01 PROCEDURE — 25010000002 HEPARIN (PORCINE) PER 1000 UNITS: Performed by: ANESTHESIOLOGY

## 2020-01-01 PROCEDURE — 33508 ENDOSCOPIC VEIN HARVEST: CPT | Performed by: THORACIC SURGERY (CARDIOTHORACIC VASCULAR SURGERY)

## 2020-01-01 PROCEDURE — 92950 HEART/LUNG RESUSCITATION CPR: CPT

## 2020-01-01 PROCEDURE — 25010000002 ALBUMIN HUMAN 5% PER 50 ML: Performed by: ANESTHESIOLOGY

## 2020-01-01 PROCEDURE — P9041 ALBUMIN (HUMAN),5%, 50ML: HCPCS | Performed by: NURSE PRACTITIONER

## 2020-01-01 PROCEDURE — 25010000002 CALCIUM GLUCONATE-NACL 1-0.675 GM/50ML-% SOLUTION: Performed by: INTERNAL MEDICINE

## 2020-01-01 PROCEDURE — 25010000002 HEPARIN (PORCINE) PER 1000 UNITS: Performed by: THORACIC SURGERY (CARDIOTHORACIC VASCULAR SURGERY)

## 2020-01-01 PROCEDURE — 85576 BLOOD PLATELET AGGREGATION: CPT | Performed by: THORACIC SURGERY (CARDIOTHORACIC VASCULAR SURGERY)

## 2020-01-01 PROCEDURE — 85014 HEMATOCRIT: CPT

## 2020-01-01 PROCEDURE — 80053 COMPREHEN METABOLIC PANEL: CPT | Performed by: NURSE PRACTITIONER

## 2020-01-01 PROCEDURE — 99221 1ST HOSP IP/OBS SF/LOW 40: CPT | Performed by: NURSE PRACTITIONER

## 2020-01-01 PROCEDURE — P9035 PLATELET PHERES LEUKOREDUCED: HCPCS

## 2020-01-01 PROCEDURE — 94660 CPAP INITIATION&MGMT: CPT

## 2020-01-01 PROCEDURE — 94002 VENT MGMT INPAT INIT DAY: CPT

## 2020-01-01 PROCEDURE — 33522 CABG ARTERY-VEIN FIVE: CPT | Performed by: THORACIC SURGERY (CARDIOTHORACIC VASCULAR SURGERY)

## 2020-01-01 PROCEDURE — 99285 EMERGENCY DEPT VISIT HI MDM: CPT

## 2020-01-01 PROCEDURE — 87186 SC STD MICRODIL/AGAR DIL: CPT | Performed by: NURSE PRACTITIONER

## 2020-01-01 PROCEDURE — 4A023N7 MEASUREMENT OF CARDIAC SAMPLING AND PRESSURE, LEFT HEART, PERCUTANEOUS APPROACH: ICD-10-PCS | Performed by: INTERNAL MEDICINE

## 2020-01-01 PROCEDURE — 5A1221Z PERFORMANCE OF CARDIAC OUTPUT, CONTINUOUS: ICD-10-PCS | Performed by: THORACIC SURGERY (CARDIOTHORACIC VASCULAR SURGERY)

## 2020-01-01 PROCEDURE — 25010000002 MAGNESIUM SULFATE 2 GM/50ML SOLUTION: Performed by: THORACIC SURGERY (CARDIOTHORACIC VASCULAR SURGERY)

## 2020-01-01 PROCEDURE — 25010000002 CEFAZOLIN PER 500 MG: Performed by: NURSE PRACTITIONER

## 2020-01-01 DEVICE — SS SUTURE, 3 PER SLEEVE
Type: IMPLANTABLE DEVICE | Site: STERNUM | Status: FUNCTIONAL
Brand: MYO/WIRE II

## 2020-01-01 DEVICE — SS SUTURE, 6 PER SLEEVE
Type: IMPLANTABLE DEVICE | Site: STERNUM | Status: FUNCTIONAL
Brand: MYO/WIRE II

## 2020-01-01 DEVICE — CLIP LIGAT VASC HORIZON TI SM/WD RED 24CT: Type: IMPLANTABLE DEVICE | Site: CHEST | Status: FUNCTIONAL

## 2020-01-01 DEVICE — RNG MITRAL PHYSIOII MDL 5200 30MM: Type: IMPLANTABLE DEVICE | Site: HEART | Status: FUNCTIONAL

## 2020-01-01 DEVICE — INCISIONLINE PLEDGET TFLN SFT LG: Type: IMPLANTABLE DEVICE | Site: CHEST | Status: FUNCTIONAL

## 2020-01-01 DEVICE — DEV CONTRL TISS STRATAFIX SPIRAL MNCRYL UD 3/0 PLS 30CM: Type: IMPLANTABLE DEVICE | Site: CHEST | Status: FUNCTIONAL

## 2020-01-01 DEVICE — DEV CONTRL TISS STRATAFIXSPIRALMNCRYL PLSPS2 REV3/0 15CM: Type: IMPLANTABLE DEVICE | Site: LEG | Status: FUNCTIONAL

## 2020-01-01 DEVICE — COR-KNOT MINI® COMBO KITBASE PACKAGE TYPE - KITEACH STERILE PACKAGE KIT CONTAINS (2) SINGLE PATIENT USE COR-KNOT MINI® DEVICES AND (12) COR-KNOT® QUICK LOADS®.
Type: IMPLANTABLE DEVICE | Site: HEART | Status: FUNCTIONAL
Brand: COR-KNOT MINI®

## 2020-01-01 RX ORDER — CEFAZOLIN SODIUM IN 0.9 % NACL 3 G/100 ML
3 INTRAVENOUS SOLUTION, PIGGYBACK (ML) INTRAVENOUS EVERY 12 HOURS
Status: DISCONTINUED | OUTPATIENT
Start: 2020-01-01 | End: 2021-01-01

## 2020-01-01 RX ORDER — LORAZEPAM 2 MG/ML
1 INJECTION INTRAMUSCULAR EVERY 4 HOURS PRN
Status: DISCONTINUED | OUTPATIENT
Start: 2020-01-01 | End: 2021-01-01 | Stop reason: HOSPADM

## 2020-01-01 RX ORDER — DIPHENHYDRAMINE HCL 25 MG
25 CAPSULE ORAL NIGHTLY PRN
Status: DISCONTINUED | OUTPATIENT
Start: 2020-01-01 | End: 2020-01-01

## 2020-01-01 RX ORDER — CHLORHEXIDINE GLUCONATE 500 MG/1
1 CLOTH TOPICAL EVERY 12 HOURS PRN
Status: DISCONTINUED | OUTPATIENT
Start: 2020-01-01 | End: 2021-01-01 | Stop reason: HOSPADM

## 2020-01-01 RX ORDER — FUROSEMIDE 10 MG/ML
40 INJECTION INTRAMUSCULAR; INTRAVENOUS ONCE
Status: COMPLETED | OUTPATIENT
Start: 2020-01-01 | End: 2020-01-01

## 2020-01-01 RX ORDER — AMOXICILLIN 250 MG
2 CAPSULE ORAL 2 TIMES DAILY
Status: DISCONTINUED | OUTPATIENT
Start: 2020-01-01 | End: 2021-01-01 | Stop reason: HOSPADM

## 2020-01-01 RX ORDER — ASPIRIN 81 MG/1
81 TABLET ORAL DAILY
Status: DISCONTINUED | OUTPATIENT
Start: 2020-01-01 | End: 2020-01-01 | Stop reason: SDUPTHER

## 2020-01-01 RX ORDER — SODIUM CHLORIDE 9 MG/ML
INJECTION, SOLUTION INTRAVENOUS CONTINUOUS PRN
Status: DISCONTINUED | OUTPATIENT
Start: 2020-01-01 | End: 2020-01-01 | Stop reason: SURG

## 2020-01-01 RX ORDER — CALCIUM GLUCONATE 94 MG/ML
INJECTION, SOLUTION INTRAVENOUS AS NEEDED
Status: DISCONTINUED | OUTPATIENT
Start: 2020-01-01 | End: 2020-01-01 | Stop reason: SURG

## 2020-01-01 RX ORDER — SODIUM CHLORIDE 0.9 % (FLUSH) 0.9 %
30 SYRINGE (ML) INJECTION ONCE AS NEEDED
Status: DISCONTINUED | OUTPATIENT
Start: 2020-01-01 | End: 2021-01-01 | Stop reason: HOSPADM

## 2020-01-01 RX ORDER — OXYCODONE HYDROCHLORIDE 5 MG/1
10 TABLET ORAL EVERY 4 HOURS PRN
Status: DISCONTINUED | OUTPATIENT
Start: 2020-01-01 | End: 2021-01-01 | Stop reason: HOSPADM

## 2020-01-01 RX ORDER — CEFAZOLIN SODIUM 1 G/3ML
INJECTION, POWDER, FOR SOLUTION INTRAMUSCULAR; INTRAVENOUS AS NEEDED
Status: DISCONTINUED | OUTPATIENT
Start: 2020-01-01 | End: 2020-01-01 | Stop reason: SURG

## 2020-01-01 RX ORDER — MAGNESIUM SULFATE HEPTAHYDRATE 40 MG/ML
4 INJECTION, SOLUTION INTRAVENOUS AS NEEDED
Status: DISCONTINUED | OUTPATIENT
Start: 2020-01-01 | End: 2021-01-01 | Stop reason: HOSPADM

## 2020-01-01 RX ORDER — MAGNESIUM SULFATE HEPTAHYDRATE 40 MG/ML
2 INJECTION, SOLUTION INTRAVENOUS ONCE
Status: COMPLETED | OUTPATIENT
Start: 2020-01-01 | End: 2020-01-01

## 2020-01-01 RX ORDER — ROSUVASTATIN CALCIUM 10 MG/1
20 TABLET, COATED ORAL DAILY
Status: DISCONTINUED | OUTPATIENT
Start: 2020-01-01 | End: 2021-01-01 | Stop reason: HOSPADM

## 2020-01-01 RX ORDER — ALBUMIN, HUMAN INJ 5% 5 %
1500 SOLUTION INTRAVENOUS AS NEEDED
Status: DISPENSED | OUTPATIENT
Start: 2020-01-01 | End: 2020-01-01

## 2020-01-01 RX ORDER — DIPHENHYDRAMINE HCL 25 MG
25 CAPSULE ORAL EVERY 6 HOURS PRN
Status: DISCONTINUED | OUTPATIENT
Start: 2020-01-01 | End: 2020-01-01

## 2020-01-01 RX ORDER — FENTANYL CITRATE 50 UG/ML
INJECTION, SOLUTION INTRAMUSCULAR; INTRAVENOUS AS NEEDED
Status: DISCONTINUED | OUTPATIENT
Start: 2020-01-01 | End: 2020-01-01 | Stop reason: HOSPADM

## 2020-01-01 RX ORDER — ACETAMINOPHEN 325 MG/1
650 TABLET ORAL EVERY 4 HOURS PRN
Status: DISCONTINUED | OUTPATIENT
Start: 2020-01-01 | End: 2020-01-01 | Stop reason: SDUPTHER

## 2020-01-01 RX ORDER — ONDANSETRON 4 MG/1
4 TABLET, FILM COATED ORAL EVERY 6 HOURS PRN
Status: DISCONTINUED | OUTPATIENT
Start: 2020-01-01 | End: 2021-01-01 | Stop reason: HOSPADM

## 2020-01-01 RX ORDER — SODIUM CHLORIDE 9 MG/ML
30 INJECTION, SOLUTION INTRAVENOUS CONTINUOUS
Status: DISPENSED | OUTPATIENT
Start: 2020-01-01 | End: 2020-01-01

## 2020-01-01 RX ORDER — PHENYLEPHRINE HCL IN 0.9% NACL 0.5 MG/5ML
SYRINGE (ML) INTRAVENOUS AS NEEDED
Status: DISCONTINUED | OUTPATIENT
Start: 2020-01-01 | End: 2020-01-01 | Stop reason: SURG

## 2020-01-01 RX ORDER — DIPHENOXYLATE HYDROCHLORIDE AND ATROPINE SULFATE 2.5; .025 MG/1; MG/1
1 TABLET ORAL DAILY
Status: DISCONTINUED | OUTPATIENT
Start: 2020-01-01 | End: 2020-01-01

## 2020-01-01 RX ORDER — ETOMIDATE 2 MG/ML
INJECTION INTRAVENOUS AS NEEDED
Status: DISCONTINUED | OUTPATIENT
Start: 2020-01-01 | End: 2020-01-01 | Stop reason: SURG

## 2020-01-01 RX ORDER — DIPHENHYDRAMINE HYDROCHLORIDE 50 MG/ML
50 INJECTION INTRAMUSCULAR; INTRAVENOUS ONCE
Status: CANCELLED | OUTPATIENT
Start: 2020-01-01 | End: 2020-01-01

## 2020-01-01 RX ORDER — VECURONIUM BROMIDE 1 MG/ML
INJECTION, POWDER, LYOPHILIZED, FOR SOLUTION INTRAVENOUS AS NEEDED
Status: DISCONTINUED | OUTPATIENT
Start: 2020-01-01 | End: 2020-01-01 | Stop reason: SURG

## 2020-01-01 RX ORDER — AMINOCAPROIC ACID 250 MG/ML
INJECTION, SOLUTION INTRAVENOUS AS NEEDED
Status: DISCONTINUED | OUTPATIENT
Start: 2020-01-01 | End: 2020-01-01 | Stop reason: SURG

## 2020-01-01 RX ORDER — IPRATROPIUM BROMIDE AND ALBUTEROL SULFATE 2.5; .5 MG/3ML; MG/3ML
3 SOLUTION RESPIRATORY (INHALATION) EVERY 4 HOURS PRN
Status: DISCONTINUED | OUTPATIENT
Start: 2020-01-01 | End: 2021-01-01 | Stop reason: HOSPADM

## 2020-01-01 RX ORDER — SODIUM CHLORIDE 0.9 % (FLUSH) 0.9 %
10 SYRINGE (ML) INJECTION AS NEEDED
Status: DISCONTINUED | OUTPATIENT
Start: 2020-01-01 | End: 2021-01-01 | Stop reason: HOSPADM

## 2020-01-01 RX ORDER — METHYLPREDNISOLONE SODIUM SUCCINATE 125 MG/2ML
120 INJECTION, POWDER, LYOPHILIZED, FOR SOLUTION INTRAMUSCULAR; INTRAVENOUS ONCE
Status: CANCELLED | OUTPATIENT
Start: 2020-01-01 | End: 2020-01-01

## 2020-01-01 RX ORDER — ETOMIDATE 2 MG/ML
INJECTION INTRAVENOUS AS NEEDED
Status: DISCONTINUED | OUTPATIENT
Start: 2020-01-01 | End: 2020-01-01

## 2020-01-01 RX ORDER — CEFAZOLIN SODIUM IN 0.9 % NACL 3 G/100 ML
3 INTRAVENOUS SOLUTION, PIGGYBACK (ML) INTRAVENOUS EVERY 8 HOURS
Status: DISCONTINUED | OUTPATIENT
Start: 2020-01-01 | End: 2020-01-01

## 2020-01-01 RX ORDER — CHLORHEXIDINE GLUCONATE 0.12 MG/ML
15 RINSE ORAL ONCE
Status: DISCONTINUED | OUTPATIENT
Start: 2020-01-01 | End: 2020-01-01 | Stop reason: SDUPTHER

## 2020-01-01 RX ORDER — ALBUMIN, HUMAN INJ 5% 5 %
250 SOLUTION INTRAVENOUS ONCE
Status: COMPLETED | OUTPATIENT
Start: 2020-01-01 | End: 2020-01-01

## 2020-01-01 RX ORDER — DEXMEDETOMIDINE HYDROCHLORIDE 4 UG/ML
.2-1.5 INJECTION, SOLUTION INTRAVENOUS
Status: DISCONTINUED | OUTPATIENT
Start: 2020-01-01 | End: 2021-01-01 | Stop reason: HOSPADM

## 2020-01-01 RX ORDER — POTASSIUM CHLORIDE 7.45 MG/ML
10 INJECTION INTRAVENOUS
Status: DISCONTINUED | OUTPATIENT
Start: 2020-01-01 | End: 2021-01-01 | Stop reason: HOSPADM

## 2020-01-01 RX ORDER — ACETAMINOPHEN 325 MG/1
650 TABLET ORAL EVERY 4 HOURS PRN
Status: DISCONTINUED | OUTPATIENT
Start: 2020-01-01 | End: 2021-01-01 | Stop reason: HOSPADM

## 2020-01-01 RX ORDER — ACETAMINOPHEN 325 MG/1
650 TABLET ORAL EVERY 4 HOURS
Status: DISPENSED | OUTPATIENT
Start: 2020-01-01 | End: 2020-01-01

## 2020-01-01 RX ORDER — HYDROCODONE BITARTRATE AND ACETAMINOPHEN 5; 325 MG/1; MG/1
1 TABLET ORAL EVERY 4 HOURS PRN
Status: CANCELLED | OUTPATIENT
Start: 2020-01-01 | End: 2021-01-07

## 2020-01-01 RX ORDER — MILRINONE LACTATE 0.2 MG/ML
.25-.75 INJECTION, SOLUTION INTRAVENOUS CONTINUOUS PRN
Status: DISCONTINUED | OUTPATIENT
Start: 2020-01-01 | End: 2021-01-01 | Stop reason: HOSPADM

## 2020-01-01 RX ORDER — MORPHINE SULFATE 4 MG/ML
4 INJECTION, SOLUTION INTRAMUSCULAR; INTRAVENOUS
Status: DISCONTINUED | OUTPATIENT
Start: 2020-01-01 | End: 2020-01-01

## 2020-01-01 RX ORDER — ASPIRIN 81 MG/1
324 TABLET, CHEWABLE ORAL ONCE
Status: COMPLETED | OUTPATIENT
Start: 2020-01-01 | End: 2020-01-01

## 2020-01-01 RX ORDER — NOREPINEPHRINE BIT/0.9 % NACL 8 MG/250ML
.02-.3 INFUSION BOTTLE (ML) INTRAVENOUS
Status: DISCONTINUED | OUTPATIENT
Start: 2020-01-01 | End: 2020-01-01 | Stop reason: SDUPTHER

## 2020-01-01 RX ORDER — MAGNESIUM SULFATE 1 G/100ML
1 INJECTION INTRAVENOUS EVERY 8 HOURS
Status: COMPLETED | OUTPATIENT
Start: 2020-01-01 | End: 2020-01-01

## 2020-01-01 RX ORDER — EPHEDRINE SULFATE/0.9% NACL/PF 25 MG/5 ML
SYRINGE (ML) INTRAVENOUS AS NEEDED
Status: DISCONTINUED | OUTPATIENT
Start: 2020-01-01 | End: 2020-01-01 | Stop reason: SURG

## 2020-01-01 RX ORDER — FAMOTIDINE 10 MG/ML
20 INJECTION, SOLUTION INTRAVENOUS EVERY 12 HOURS SCHEDULED
Status: DISCONTINUED | OUTPATIENT
Start: 2020-01-01 | End: 2021-01-01 | Stop reason: HOSPADM

## 2020-01-01 RX ORDER — SODIUM CHLORIDE 0.9 % (FLUSH) 0.9 %
3-10 SYRINGE (ML) INJECTION AS NEEDED
Status: CANCELLED | OUTPATIENT
Start: 2020-01-01

## 2020-01-01 RX ORDER — POTASSIUM CHLORIDE 1.5 G/1.77G
20 POWDER, FOR SOLUTION ORAL ONCE
Status: COMPLETED | OUTPATIENT
Start: 2020-01-01 | End: 2020-01-01

## 2020-01-01 RX ORDER — NOREPINEPHRINE BIT/0.9 % NACL 8 MG/250ML
.02-.3 INFUSION BOTTLE (ML) INTRAVENOUS CONTINUOUS PRN
Status: DISCONTINUED | OUTPATIENT
Start: 2020-01-01 | End: 2021-01-01 | Stop reason: HOSPADM

## 2020-01-01 RX ORDER — SODIUM CHLORIDE 9 MG/ML
30 INJECTION, SOLUTION INTRAVENOUS CONTINUOUS PRN
Status: DISCONTINUED | OUTPATIENT
Start: 2020-01-01 | End: 2020-01-01 | Stop reason: SDUPTHER

## 2020-01-01 RX ORDER — PHENYLEPHRINE HCL IN 0.9% NACL 0.5 MG/5ML
.5-3 SYRINGE (ML) INTRAVENOUS
Status: DISCONTINUED | OUTPATIENT
Start: 2020-01-01 | End: 2021-01-01 | Stop reason: HOSPADM

## 2020-01-01 RX ORDER — AMIODARONE HCL/D5W 450 MG/250
1 PLASTIC BAG, INJECTION (ML) INTRAVENOUS CONTINUOUS
Status: DISCONTINUED | OUTPATIENT
Start: 2020-01-01 | End: 2021-01-01 | Stop reason: HOSPADM

## 2020-01-01 RX ORDER — ONDANSETRON 2 MG/ML
4 INJECTION INTRAMUSCULAR; INTRAVENOUS EVERY 6 HOURS PRN
Status: DISCONTINUED | OUTPATIENT
Start: 2020-01-01 | End: 2020-01-01 | Stop reason: SDUPTHER

## 2020-01-01 RX ORDER — ACETAMINOPHEN 650 MG/1
650 SUPPOSITORY RECTAL EVERY 4 HOURS
Status: DISPENSED | OUTPATIENT
Start: 2020-01-01 | End: 2020-01-01

## 2020-01-01 RX ORDER — ACETAMINOPHEN 650 MG/1
650 SUPPOSITORY RECTAL EVERY 4 HOURS PRN
Status: DISCONTINUED | OUTPATIENT
Start: 2020-01-01 | End: 2020-01-01 | Stop reason: SDUPTHER

## 2020-01-01 RX ORDER — ALPRAZOLAM 0.25 MG/1
0.25 TABLET ORAL EVERY 8 HOURS PRN
Status: DISCONTINUED | OUTPATIENT
Start: 2020-01-01 | End: 2021-01-01 | Stop reason: HOSPADM

## 2020-01-01 RX ORDER — SODIUM CHLORIDE 0.9 % (FLUSH) 0.9 %
10 SYRINGE (ML) INJECTION EVERY 12 HOURS SCHEDULED
Status: DISCONTINUED | OUTPATIENT
Start: 2020-01-01 | End: 2021-01-01 | Stop reason: HOSPADM

## 2020-01-01 RX ORDER — METOCLOPRAMIDE HYDROCHLORIDE 5 MG/ML
10 INJECTION INTRAMUSCULAR; INTRAVENOUS EVERY 6 HOURS
Status: COMPLETED | OUTPATIENT
Start: 2020-01-01 | End: 2020-01-01

## 2020-01-01 RX ORDER — ACETAMINOPHEN 650 MG/1
650 SUPPOSITORY RECTAL EVERY 4 HOURS PRN
Status: DISCONTINUED | OUTPATIENT
Start: 2020-01-01 | End: 2021-01-01 | Stop reason: HOSPADM

## 2020-01-01 RX ORDER — POLYETHYLENE GLYCOL 3350 17 G/17G
17 POWDER, FOR SOLUTION ORAL DAILY PRN
Status: DISCONTINUED | OUTPATIENT
Start: 2020-01-01 | End: 2020-01-01

## 2020-01-01 RX ORDER — ACETAMINOPHEN 160 MG/5ML
650 SOLUTION ORAL EVERY 4 HOURS PRN
Status: DISCONTINUED | OUTPATIENT
Start: 2020-01-01 | End: 2021-01-01 | Stop reason: HOSPADM

## 2020-01-01 RX ORDER — ONDANSETRON 2 MG/ML
4 INJECTION INTRAMUSCULAR; INTRAVENOUS EVERY 6 HOURS PRN
Status: DISCONTINUED | OUTPATIENT
Start: 2020-01-01 | End: 2021-01-01 | Stop reason: HOSPADM

## 2020-01-01 RX ORDER — ONDANSETRON 2 MG/ML
4 INJECTION INTRAMUSCULAR; INTRAVENOUS EVERY 6 HOURS PRN
Status: CANCELLED | OUTPATIENT
Start: 2020-01-01

## 2020-01-01 RX ORDER — NITROGLYCERIN 0.4 MG/1
0.4 TABLET SUBLINGUAL
Status: CANCELLED | OUTPATIENT
Start: 2020-01-01

## 2020-01-01 RX ORDER — HEPARIN SODIUM 1000 [USP'U]/ML
INJECTION, SOLUTION INTRAVENOUS; SUBCUTANEOUS AS NEEDED
Status: DISCONTINUED | OUTPATIENT
Start: 2020-01-01 | End: 2020-01-01 | Stop reason: SURG

## 2020-01-01 RX ORDER — MAGNESIUM HYDROXIDE 1200 MG/15ML
LIQUID ORAL AS NEEDED
Status: DISCONTINUED | OUTPATIENT
Start: 2020-01-01 | End: 2020-01-01 | Stop reason: HOSPADM

## 2020-01-01 RX ORDER — CALCIUM GLUCONATE 20 MG/ML
1 INJECTION, SOLUTION INTRAVENOUS ONCE
Status: COMPLETED | OUTPATIENT
Start: 2020-01-01 | End: 2020-01-01

## 2020-01-01 RX ORDER — BISACODYL 10 MG
10 SUPPOSITORY, RECTAL RECTAL DAILY PRN
Status: DISCONTINUED | OUTPATIENT
Start: 2020-01-01 | End: 2021-01-01 | Stop reason: HOSPADM

## 2020-01-01 RX ORDER — NICARDIPINE HYDROCHLORIDE 2.5 MG/ML
INJECTION INTRAVENOUS AS NEEDED
Status: DISCONTINUED | OUTPATIENT
Start: 2020-01-01 | End: 2020-01-01 | Stop reason: HOSPADM

## 2020-01-01 RX ORDER — MILRINONE LACTATE 0.2 MG/ML
INJECTION, SOLUTION INTRAVENOUS CONTINUOUS PRN
Status: DISCONTINUED | OUTPATIENT
Start: 2020-01-01 | End: 2020-01-01 | Stop reason: SURG

## 2020-01-01 RX ORDER — ALUMINA, MAGNESIA, AND SIMETHICONE 2400; 2400; 240 MG/30ML; MG/30ML; MG/30ML
15 SUSPENSION ORAL EVERY 6 HOURS PRN
Status: DISCONTINUED | OUTPATIENT
Start: 2020-01-01 | End: 2021-01-01 | Stop reason: HOSPADM

## 2020-01-01 RX ORDER — CARVEDILOL 6.25 MG/1
6.25 TABLET ORAL EVERY EVENING
COMMUNITY

## 2020-01-01 RX ORDER — ALUMINA, MAGNESIA, AND SIMETHICONE 2400; 2400; 240 MG/30ML; MG/30ML; MG/30ML
15 SUSPENSION ORAL EVERY 6 HOURS PRN
Status: DISCONTINUED | OUTPATIENT
Start: 2020-01-01 | End: 2020-01-01 | Stop reason: SDUPTHER

## 2020-01-01 RX ORDER — ACETAMINOPHEN 160 MG/5ML
650 SOLUTION ORAL EVERY 4 HOURS
Status: DISPENSED | OUTPATIENT
Start: 2020-01-01 | End: 2020-01-01

## 2020-01-01 RX ORDER — MIDAZOLAM HYDROCHLORIDE 1 MG/ML
INJECTION INTRAMUSCULAR; INTRAVENOUS AS NEEDED
Status: DISCONTINUED | OUTPATIENT
Start: 2020-01-01 | End: 2020-01-01 | Stop reason: SURG

## 2020-01-01 RX ORDER — HEPARIN SODIUM 10000 [USP'U]/100ML
6.8 INJECTION, SOLUTION INTRAVENOUS
Status: DISCONTINUED | OUTPATIENT
Start: 2020-01-01 | End: 2020-01-01

## 2020-01-01 RX ORDER — CLOPIDOGREL BISULFATE 75 MG/1
75 TABLET ORAL DAILY
Status: DISCONTINUED | OUTPATIENT
Start: 2020-01-01 | End: 2020-01-01

## 2020-01-01 RX ORDER — ACETAMINOPHEN 160 MG/5ML
650 SOLUTION ORAL EVERY 4 HOURS PRN
Status: DISCONTINUED | OUTPATIENT
Start: 2020-01-01 | End: 2020-01-01 | Stop reason: SDUPTHER

## 2020-01-01 RX ORDER — MIDAZOLAM HYDROCHLORIDE 1 MG/ML
INJECTION INTRAMUSCULAR; INTRAVENOUS AS NEEDED
Status: DISCONTINUED | OUTPATIENT
Start: 2020-01-01 | End: 2020-01-01 | Stop reason: HOSPADM

## 2020-01-01 RX ORDER — FENTANYL CITRATE 50 UG/ML
INJECTION, SOLUTION INTRAMUSCULAR; INTRAVENOUS AS NEEDED
Status: DISCONTINUED | OUTPATIENT
Start: 2020-01-01 | End: 2020-01-01 | Stop reason: SURG

## 2020-01-01 RX ORDER — ALBUMIN, HUMAN INJ 5% 5 %
SOLUTION INTRAVENOUS CONTINUOUS PRN
Status: DISCONTINUED | OUTPATIENT
Start: 2020-01-01 | End: 2020-01-01 | Stop reason: SURG

## 2020-01-01 RX ORDER — NITROGLYCERIN 5 MG/ML
INJECTION, SOLUTION INTRAVENOUS AS NEEDED
Status: DISCONTINUED | OUTPATIENT
Start: 2020-01-01 | End: 2020-01-01 | Stop reason: HOSPADM

## 2020-01-01 RX ORDER — CHLORHEXIDINE GLUCONATE 0.12 MG/ML
15 RINSE ORAL EVERY 12 HOURS
Status: DISCONTINUED | OUTPATIENT
Start: 2020-01-01 | End: 2021-01-01 | Stop reason: HOSPADM

## 2020-01-01 RX ORDER — NITROGLYCERIN 20 MG/100ML
5-200 INJECTION INTRAVENOUS
Status: DISCONTINUED | OUTPATIENT
Start: 2020-01-01 | End: 2020-01-01 | Stop reason: SDUPTHER

## 2020-01-01 RX ORDER — NITROGLYCERIN 20 MG/100ML
5-200 INJECTION INTRAVENOUS
Status: DISCONTINUED | OUTPATIENT
Start: 2020-01-01 | End: 2020-01-01

## 2020-01-01 RX ORDER — NALOXONE HCL 0.4 MG/ML
0.4 VIAL (ML) INJECTION
Status: DISCONTINUED | OUTPATIENT
Start: 2020-01-01 | End: 2021-01-01 | Stop reason: HOSPADM

## 2020-01-01 RX ORDER — CYCLOBENZAPRINE HCL 10 MG
10 TABLET ORAL EVERY 8 HOURS PRN
Status: DISCONTINUED | OUTPATIENT
Start: 2020-01-01 | End: 2021-01-01 | Stop reason: HOSPADM

## 2020-01-01 RX ORDER — ONDANSETRON 4 MG/1
4 TABLET, FILM COATED ORAL EVERY 6 HOURS PRN
Status: DISCONTINUED | OUTPATIENT
Start: 2020-01-01 | End: 2020-01-01 | Stop reason: SDUPTHER

## 2020-01-01 RX ORDER — CEFAZOLIN SODIUM IN 0.9 % NACL 3 G/100 ML
3 INTRAVENOUS SOLUTION, PIGGYBACK (ML) INTRAVENOUS ONCE
Status: DISCONTINUED | OUTPATIENT
Start: 2020-01-01 | End: 2020-01-01 | Stop reason: SDUPTHER

## 2020-01-01 RX ORDER — CHOLECALCIFEROL (VITAMIN D3) 125 MCG
5 CAPSULE ORAL NIGHTLY PRN
Status: DISCONTINUED | OUTPATIENT
Start: 2020-01-01 | End: 2021-01-01 | Stop reason: HOSPADM

## 2020-01-01 RX ORDER — MORPHINE SULFATE 4 MG/ML
2 INJECTION, SOLUTION INTRAMUSCULAR; INTRAVENOUS EVERY 4 HOURS PRN
Status: DISCONTINUED | OUTPATIENT
Start: 2020-01-01 | End: 2021-01-01 | Stop reason: HOSPADM

## 2020-01-01 RX ORDER — DOPAMINE HYDROCHLORIDE 160 MG/100ML
2-20 INJECTION, SOLUTION INTRAVENOUS CONTINUOUS PRN
Status: DISCONTINUED | OUTPATIENT
Start: 2020-01-01 | End: 2020-01-01

## 2020-01-01 RX ORDER — MAGNESIUM SULFATE HEPTAHYDRATE 40 MG/ML
2 INJECTION, SOLUTION INTRAVENOUS AS NEEDED
Status: DISCONTINUED | OUTPATIENT
Start: 2020-01-01 | End: 2021-01-01 | Stop reason: HOSPADM

## 2020-01-01 RX ORDER — IRBESARTAN 300 MG/1
300 TABLET ORAL NIGHTLY
COMMUNITY

## 2020-01-01 RX ORDER — SODIUM CHLORIDE 9 MG/ML
30 INJECTION, SOLUTION INTRAVENOUS CONTINUOUS PRN
Status: DISCONTINUED | OUTPATIENT
Start: 2020-01-01 | End: 2021-01-01 | Stop reason: HOSPADM

## 2020-01-01 RX ORDER — ASPIRIN 81 MG/1
81 TABLET ORAL DAILY
Status: DISCONTINUED | OUTPATIENT
Start: 2020-01-01 | End: 2021-01-01 | Stop reason: HOSPADM

## 2020-01-01 RX ORDER — ACETAMINOPHEN 650 MG
TABLET, EXTENDED RELEASE ORAL AS NEEDED
Status: DISCONTINUED | OUTPATIENT
Start: 2020-01-01 | End: 2020-01-01 | Stop reason: HOSPADM

## 2020-01-01 RX ORDER — MEPERIDINE HYDROCHLORIDE 25 MG/ML
25 INJECTION INTRAMUSCULAR; INTRAVENOUS; SUBCUTANEOUS EVERY 4 HOURS PRN
Status: ACTIVE | OUTPATIENT
Start: 2020-01-01 | End: 2020-01-01

## 2020-01-01 RX ORDER — NOREPINEPHRINE BITARTRATE/D5W 8 MG/250ML
PLASTIC BAG, INJECTION (ML) INTRAVENOUS CONTINUOUS PRN
Status: DISCONTINUED | OUTPATIENT
Start: 2020-01-01 | End: 2020-01-01 | Stop reason: SURG

## 2020-01-01 RX ORDER — HYDROCODONE BITARTRATE AND ACETAMINOPHEN 10; 325 MG/1; MG/1
1 TABLET ORAL EVERY 4 HOURS PRN
Status: DISCONTINUED | OUTPATIENT
Start: 2020-01-01 | End: 2021-01-01 | Stop reason: HOSPADM

## 2020-01-01 RX ORDER — ATORVASTATIN CALCIUM 40 MG/1
40 TABLET, FILM COATED ORAL NIGHTLY
Status: DISCONTINUED | OUTPATIENT
Start: 2020-01-01 | End: 2020-01-01

## 2020-01-01 RX ORDER — LIDOCAINE HYDROCHLORIDE 20 MG/ML
INJECTION, SOLUTION INFILTRATION; PERINEURAL AS NEEDED
Status: DISCONTINUED | OUTPATIENT
Start: 2020-01-01 | End: 2020-01-01 | Stop reason: HOSPADM

## 2020-01-01 RX ORDER — ALLOPURINOL 100 MG/1
100 TABLET ORAL 2 TIMES DAILY
Status: DISCONTINUED | OUTPATIENT
Start: 2020-01-01 | End: 2020-01-01

## 2020-01-01 RX ORDER — BISACODYL 5 MG/1
10 TABLET, DELAYED RELEASE ORAL DAILY PRN
Status: DISCONTINUED | OUTPATIENT
Start: 2020-01-01 | End: 2021-01-01 | Stop reason: HOSPADM

## 2020-01-01 RX ORDER — POLYETHYLENE GLYCOL 3350 17 G/17G
17 POWDER, FOR SOLUTION ORAL 2 TIMES DAILY
Status: DISCONTINUED | OUTPATIENT
Start: 2020-01-01 | End: 2021-01-01 | Stop reason: HOSPADM

## 2020-01-01 RX ORDER — SODIUM CHLORIDE 0.9 % (FLUSH) 0.9 %
3 SYRINGE (ML) INJECTION EVERY 12 HOURS SCHEDULED
Status: CANCELLED | OUTPATIENT
Start: 2020-01-01

## 2020-01-01 RX ORDER — CARVEDILOL 6.25 MG/1
6.25 TABLET ORAL
COMMUNITY

## 2020-01-01 RX ORDER — PHENYLEPHRINE HCL IN 0.9% NACL 0.5 MG/5ML
.2-3 SYRINGE (ML) INTRAVENOUS CONTINUOUS PRN
Status: DISCONTINUED | OUTPATIENT
Start: 2020-01-01 | End: 2020-01-01 | Stop reason: SDUPTHER

## 2020-01-01 RX ADMIN — FENTANYL CITRATE 250 MCG: 50 INJECTION, SOLUTION INTRAMUSCULAR; INTRAVENOUS at 23:43

## 2020-01-01 RX ADMIN — HEPARIN SODIUM 40000 UNITS: 1000 INJECTION, SOLUTION INTRAVENOUS; SUBCUTANEOUS at 20:35

## 2020-01-01 RX ADMIN — VECURONIUM BROMIDE 5 MG: 1 INJECTION, POWDER, LYOPHILIZED, FOR SOLUTION INTRAVENOUS at 21:00

## 2020-01-01 RX ADMIN — METOCLOPRAMIDE HYDROCHLORIDE 10 MG: 5 INJECTION INTRAMUSCULAR; INTRAVENOUS at 12:07

## 2020-01-01 RX ADMIN — MUPIROCIN: 20 OINTMENT TOPICAL at 08:31

## 2020-01-01 RX ADMIN — ACETAMINOPHEN ORAL SOLUTION 650 MG: 650 SOLUTION ORAL at 20:13

## 2020-01-01 RX ADMIN — SODIUM CHLORIDE 1.5 MCG/KG/HR: 9 INJECTION, SOLUTION INTRAVENOUS at 20:14

## 2020-01-01 RX ADMIN — NOREPINEPHRINE BITARTRATE 0.05 MCG/KG/MIN: 8 SOLUTION at 23:30

## 2020-01-01 RX ADMIN — SODIUM CHLORIDE 0.2 MCG/KG/MIN: 9 INJECTION, SOLUTION INTRAVENOUS at 06:34

## 2020-01-01 RX ADMIN — PHENYLEPHRINE HYDROCHLORIDE 100 MCG: 10 INJECTION INTRAVENOUS at 20:15

## 2020-01-01 RX ADMIN — ETOMIDATE 20 MG: 2 INJECTION, SOLUTION INTRAVENOUS at 19:10

## 2020-01-01 RX ADMIN — DOCUSATE SODIUM 50 MG AND SENNOSIDES 8.6 MG 2 TABLET: 8.6; 5 TABLET, FILM COATED ORAL at 21:09

## 2020-01-01 RX ADMIN — VECURONIUM BROMIDE 5 MG: 1 INJECTION, POWDER, LYOPHILIZED, FOR SOLUTION INTRAVENOUS at 22:42

## 2020-01-01 RX ADMIN — LORAZEPAM 1 MG: 2 INJECTION INTRAMUSCULAR; INTRAVENOUS at 18:02

## 2020-01-01 RX ADMIN — ACETAMINOPHEN 650 MG: 160 SUSPENSION ORAL at 04:30

## 2020-01-01 RX ADMIN — AMIODARONE HYDROCHLORIDE 1 MG/MIN: 50 INJECTION, SOLUTION INTRAVENOUS at 17:45

## 2020-01-01 RX ADMIN — MORPHINE SULFATE 2 MG: 4 INJECTION INTRAVENOUS at 20:36

## 2020-01-01 RX ADMIN — HEPARIN SODIUM 10.8 UNITS/KG/HR: 10000 INJECTION, SOLUTION INTRAVENOUS at 06:58

## 2020-01-01 RX ADMIN — FENTANYL CITRATE 250 MCG: 50 INJECTION, SOLUTION INTRAMUSCULAR; INTRAVENOUS at 19:12

## 2020-01-01 RX ADMIN — SODIUM CHLORIDE 4 UNITS/HR: 9 INJECTION, SOLUTION INTRAVENOUS at 01:01

## 2020-01-01 RX ADMIN — ASPIRIN 81 MG: 81 TABLET, COATED ORAL at 08:30

## 2020-01-01 RX ADMIN — SULFUR HEXAFLUORIDE 2 ML: KIT at 15:30

## 2020-01-01 RX ADMIN — ALBUMIN HUMAN 250 ML: 0.05 INJECTION, SOLUTION INTRAVENOUS at 10:03

## 2020-01-01 RX ADMIN — POLYETHYLENE GLYCOL 3350 17 G: 17 POWDER, FOR SOLUTION ORAL at 21:09

## 2020-01-01 RX ADMIN — SODIUM CHLORIDE 3 G: 9 INJECTION, SOLUTION INTRAVENOUS at 23:20

## 2020-01-01 RX ADMIN — DOCUSATE SODIUM 50 MG AND SENNOSIDES 8.6 MG 2 TABLET: 8.6; 5 TABLET, FILM COATED ORAL at 20:13

## 2020-01-01 RX ADMIN — CHLORHEXIDINE GLUCONATE 0.12% ORAL RINSE 15 ML: 1.2 LIQUID ORAL at 21:09

## 2020-01-01 RX ADMIN — POTASSIUM CHLORIDE 10 MEQ: 10 INJECTION, SOLUTION INTRAVENOUS at 04:00

## 2020-01-01 RX ADMIN — SODIUM CHLORIDE 3 G: 9 INJECTION, SOLUTION INTRAVENOUS at 15:54

## 2020-01-01 RX ADMIN — ACETAMINOPHEN 650 MG: 325 TABLET, FILM COATED ORAL at 08:29

## 2020-01-01 RX ADMIN — MAGNESIUM SULFATE HEPTAHYDRATE 2 G: 40 INJECTION, SOLUTION INTRAVENOUS at 06:11

## 2020-01-01 RX ADMIN — SODIUM CHLORIDE 0.02 UNITS/MIN: 9 INJECTION, SOLUTION INTRAVENOUS at 02:00

## 2020-01-01 RX ADMIN — POTASSIUM CHLORIDE 10 MEQ: 7.46 INJECTION, SOLUTION INTRAVENOUS at 01:56

## 2020-01-01 RX ADMIN — AMIODARONE HYDROCHLORIDE 150 MG: 1.5 INJECTION, SOLUTION INTRAVENOUS at 17:48

## 2020-01-01 RX ADMIN — AMINOCAPROIC ACID 10 G: 250 INJECTION, SOLUTION INTRAVENOUS at 23:32

## 2020-01-01 RX ADMIN — Medication 10 ML: at 08:31

## 2020-01-01 RX ADMIN — Medication 10 MG: at 19:22

## 2020-01-01 RX ADMIN — MORPHINE SULFATE 2 MG: 4 INJECTION INTRAVENOUS at 03:54

## 2020-01-01 RX ADMIN — POTASSIUM CHLORIDE 10 MEQ: 7.46 INJECTION, SOLUTION INTRAVENOUS at 03:58

## 2020-01-01 RX ADMIN — ASPIRIN 81 MG: 81 TABLET, COATED ORAL at 07:54

## 2020-01-01 RX ADMIN — FAMOTIDINE 20 MG: 10 INJECTION INTRAVENOUS at 21:09

## 2020-01-01 RX ADMIN — MORPHINE SULFATE 2 MG: 4 INJECTION INTRAVENOUS at 21:09

## 2020-01-01 RX ADMIN — AMIODARONE HYDROCHLORIDE 0.5 MG/MIN: 50 INJECTION, SOLUTION INTRAVENOUS at 11:11

## 2020-01-01 RX ADMIN — Medication 10 ML: at 08:33

## 2020-01-01 RX ADMIN — MUPIROCIN: 20 OINTMENT TOPICAL at 08:56

## 2020-01-01 RX ADMIN — SODIUM CHLORIDE 0.03 UNITS/MIN: 9 INJECTION, SOLUTION INTRAVENOUS at 11:11

## 2020-01-01 RX ADMIN — FENTANYL CITRATE 250 MCG: 50 INJECTION, SOLUTION INTRAMUSCULAR; INTRAVENOUS at 19:30

## 2020-01-01 RX ADMIN — FUROSEMIDE 40 MG: 10 INJECTION, SOLUTION INTRAMUSCULAR; INTRAVENOUS at 14:55

## 2020-01-01 RX ADMIN — SODIUM CHLORIDE 3 G: 9 INJECTION, SOLUTION INTRAVENOUS at 08:56

## 2020-01-01 RX ADMIN — FENTANYL CITRATE 250 MCG: 50 INJECTION, SOLUTION INTRAMUSCULAR; INTRAVENOUS at 23:06

## 2020-01-01 RX ADMIN — CALCIUM GLUCONATE 1 G: 98 INJECTION, SOLUTION INTRAVENOUS at 23:20

## 2020-01-01 RX ADMIN — POLYETHYLENE GLYCOL 3350 17 G: 17 POWDER, FOR SOLUTION ORAL at 12:07

## 2020-01-01 RX ADMIN — ENOXAPARIN SODIUM 40 MG: 40 INJECTION SUBCUTANEOUS at 08:30

## 2020-01-01 RX ADMIN — Medication 10 ML: at 21:56

## 2020-01-01 RX ADMIN — IPRATROPIUM BROMIDE AND ALBUTEROL SULFATE 3 ML: 2.5; .5 SOLUTION RESPIRATORY (INHALATION) at 21:36

## 2020-01-01 RX ADMIN — SODIUM CHLORIDE 1 MCG/KG/HR: 9 INJECTION, SOLUTION INTRAVENOUS at 13:37

## 2020-01-01 RX ADMIN — MAGNESIUM SULFATE HEPTAHYDRATE 1 G: 1 INJECTION, SOLUTION INTRAVENOUS at 15:54

## 2020-01-01 RX ADMIN — AMINOCAPROIC ACID 10 G: 250 INJECTION, SOLUTION INTRAVENOUS at 19:32

## 2020-01-01 RX ADMIN — AMIODARONE HYDROCHLORIDE 150 MG: 1.5 INJECTION, SOLUTION INTRAVENOUS at 06:11

## 2020-01-01 RX ADMIN — ALLOPURINOL 100 MG: 100 TABLET ORAL at 07:55

## 2020-01-01 RX ADMIN — ENOXAPARIN SODIUM 40 MG: 40 INJECTION SUBCUTANEOUS at 20:13

## 2020-01-01 RX ADMIN — METOCLOPRAMIDE HYDROCHLORIDE 10 MG: 5 INJECTION INTRAMUSCULAR; INTRAVENOUS at 18:17

## 2020-01-01 RX ADMIN — PHENYLEPHRINE HYDROCHLORIDE 100 MCG: 10 INJECTION INTRAVENOUS at 19:23

## 2020-01-01 RX ADMIN — SODIUM CHLORIDE 0.03 UNITS/MIN: 9 INJECTION, SOLUTION INTRAVENOUS at 21:37

## 2020-01-01 RX ADMIN — POTASSIUM CHLORIDE 10 MEQ: 7.46 INJECTION, SOLUTION INTRAVENOUS at 07:28

## 2020-01-01 RX ADMIN — SODIUM CHLORIDE 0.12 MCG/KG/MIN: 9 INJECTION, SOLUTION INTRAVENOUS at 21:37

## 2020-01-01 RX ADMIN — POTASSIUM CHLORIDE 10 MEQ: 7.46 INJECTION, SOLUTION INTRAVENOUS at 06:23

## 2020-01-01 RX ADMIN — Medication 1200 MG: at 09:37

## 2020-01-01 RX ADMIN — SODIUM CHLORIDE 1 MCG/KG/HR: 9 INJECTION, SOLUTION INTRAVENOUS at 07:05

## 2020-01-01 RX ADMIN — SODIUM CHLORIDE 0.03 UNITS/MIN: 9 INJECTION, SOLUTION INTRAVENOUS at 10:01

## 2020-01-01 RX ADMIN — AMIODARONE HYDROCHLORIDE 0.5 MG/MIN: 50 INJECTION, SOLUTION INTRAVENOUS at 00:14

## 2020-01-01 RX ADMIN — SODIUM CHLORIDE 1.5 MCG/KG/HR: 9 INJECTION, SOLUTION INTRAVENOUS at 22:26

## 2020-01-01 RX ADMIN — PHENYLEPHRINE HYDROCHLORIDE 100 MCG: 10 INJECTION INTRAVENOUS at 19:49

## 2020-01-01 RX ADMIN — AMIODARONE HYDROCHLORIDE 0.5 MG/MIN: 50 INJECTION, SOLUTION INTRAVENOUS at 19:23

## 2020-01-01 RX ADMIN — VECURONIUM BROMIDE 10 MG: 1 INJECTION, POWDER, LYOPHILIZED, FOR SOLUTION INTRAVENOUS at 19:10

## 2020-01-01 RX ADMIN — SODIUM CHLORIDE 0.5 MCG/KG/HR: 9 INJECTION, SOLUTION INTRAVENOUS at 08:56

## 2020-01-01 RX ADMIN — FAMOTIDINE 20 MG: 10 INJECTION INTRAVENOUS at 20:13

## 2020-01-01 RX ADMIN — ALBUMIN HUMAN 250 ML: 0.05 INJECTION, SOLUTION INTRAVENOUS at 02:53

## 2020-01-01 RX ADMIN — Medication 5 MG: at 21:56

## 2020-01-01 RX ADMIN — MUPIROCIN: 20 OINTMENT TOPICAL at 20:15

## 2020-01-01 RX ADMIN — ACETAMINOPHEN ORAL SOLUTION 650 MG: 650 SOLUTION ORAL at 04:45

## 2020-01-01 RX ADMIN — FAMOTIDINE 20 MG: 10 INJECTION INTRAVENOUS at 08:30

## 2020-01-01 RX ADMIN — HEPARIN SODIUM 6.8 UNITS/KG/HR: 10000 INJECTION, SOLUTION INTRAVENOUS at 17:45

## 2020-01-01 RX ADMIN — Medication 10 ML: at 21:09

## 2020-01-01 RX ADMIN — SODIUM CHLORIDE 1 MCG/KG/HR: 9 INJECTION, SOLUTION INTRAVENOUS at 03:59

## 2020-01-01 RX ADMIN — MIDAZOLAM HYDROCHLORIDE 2 MG: 1 INJECTION, SOLUTION INTRAMUSCULAR; INTRAVENOUS at 22:18

## 2020-01-01 RX ADMIN — MILRINONE LACTATE IN DEXTROSE 0.25 MCG/KG/MIN: 200 INJECTION, SOLUTION INTRAVENOUS at 22:20

## 2020-01-01 RX ADMIN — SODIUM CHLORIDE 1.5 MCG/KG/HR: 9 INJECTION, SOLUTION INTRAVENOUS at 10:01

## 2020-01-01 RX ADMIN — EPINEPHRINE 0.03 MCG/KG/MIN: 1 INJECTION INTRAMUSCULAR; INTRAVENOUS; SUBCUTANEOUS at 22:20

## 2020-01-01 RX ADMIN — MUPIROCIN: 20 OINTMENT TOPICAL at 21:09

## 2020-01-01 RX ADMIN — MIDAZOLAM HYDROCHLORIDE 2 MG: 1 INJECTION, SOLUTION INTRAMUSCULAR; INTRAVENOUS at 20:31

## 2020-01-01 RX ADMIN — CEFAZOLIN 3 G: 1 INJECTION, POWDER, FOR SOLUTION INTRAMUSCULAR; INTRAVENOUS at 23:19

## 2020-01-01 RX ADMIN — MIDAZOLAM HYDROCHLORIDE 2 MG: 1 INJECTION, SOLUTION INTRAMUSCULAR; INTRAVENOUS at 19:10

## 2020-01-01 RX ADMIN — MAGNESIUM SULFATE HEPTAHYDRATE 2 G: 500 INJECTION, SOLUTION INTRAMUSCULAR; INTRAVENOUS at 22:39

## 2020-01-01 RX ADMIN — ASPIRIN 81 MG CHEWABLE TABLET 324 MG: 81 TABLET CHEWABLE at 12:55

## 2020-01-01 RX ADMIN — FENTANYL CITRATE 250 MCG: 50 INJECTION, SOLUTION INTRAMUSCULAR; INTRAVENOUS at 19:53

## 2020-01-01 RX ADMIN — CLOPIDOGREL BISULFATE 75 MG: 75 TABLET ORAL at 07:55

## 2020-01-01 RX ADMIN — CEFAZOLIN 3 G: 1 INJECTION, POWDER, FOR SOLUTION INTRAMUSCULAR; INTRAVENOUS at 19:20

## 2020-01-01 RX ADMIN — SODIUM CHLORIDE: 0.9 INJECTION, SOLUTION INTRAVENOUS at 19:03

## 2020-01-01 RX ADMIN — MIDAZOLAM HYDROCHLORIDE 2 MG: 1 INJECTION, SOLUTION INTRAMUSCULAR; INTRAVENOUS at 23:05

## 2020-01-01 RX ADMIN — AMIODARONE HYDROCHLORIDE 1 MG/MIN: 50 INJECTION, SOLUTION INTRAVENOUS at 03:57

## 2020-01-01 RX ADMIN — SODIUM CHLORIDE 1.5 MCG/KG/HR: 9 INJECTION, SOLUTION INTRAVENOUS at 15:02

## 2020-01-01 RX ADMIN — FAMOTIDINE 20 MG: 10 INJECTION INTRAVENOUS at 08:29

## 2020-01-01 RX ADMIN — SODIUM CHLORIDE 0.04 MCG/KG/MIN: 9 INJECTION, SOLUTION INTRAVENOUS at 20:14

## 2020-01-01 RX ADMIN — POTASSIUM CHLORIDE 20 MEQ: 1.5 POWDER, FOR SOLUTION ORAL at 08:46

## 2020-01-01 RX ADMIN — POTASSIUM CHLORIDE 10 MEQ: 7.46 INJECTION, SOLUTION INTRAVENOUS at 09:46

## 2020-01-01 RX ADMIN — FUROSEMIDE 40 MG: 10 INJECTION, SOLUTION INTRAMUSCULAR; INTRAVENOUS at 11:54

## 2020-01-01 RX ADMIN — ROSUVASTATIN CALCIUM 20 MG: 10 TABLET, FILM COATED ORAL at 07:54

## 2020-01-01 RX ADMIN — POTASSIUM CHLORIDE 10 MEQ: 7.46 INJECTION, SOLUTION INTRAVENOUS at 08:32

## 2020-01-01 RX ADMIN — THERA TABS 1 TABLET: TAB at 07:55

## 2020-01-01 RX ADMIN — ACETAMINOPHEN 650 MG: 325 TABLET, FILM COATED ORAL at 15:02

## 2020-01-01 RX ADMIN — SODIUM CHLORIDE 0.03 UNITS/MIN: 9 INJECTION, SOLUTION INTRAVENOUS at 23:09

## 2020-01-01 RX ADMIN — VECURONIUM BROMIDE 5 MG: 1 INJECTION, POWDER, LYOPHILIZED, FOR SOLUTION INTRAVENOUS at 19:45

## 2020-01-01 RX ADMIN — METOCLOPRAMIDE HYDROCHLORIDE 10 MG: 5 INJECTION INTRAMUSCULAR; INTRAVENOUS at 01:48

## 2020-01-01 RX ADMIN — DOCUSATE SODIUM 50 MG AND SENNOSIDES 8.6 MG 2 TABLET: 8.6; 5 TABLET, FILM COATED ORAL at 08:29

## 2020-01-01 RX ADMIN — CHLORHEXIDINE GLUCONATE 0.12% ORAL RINSE 15 ML: 1.2 LIQUID ORAL at 08:30

## 2020-01-01 RX ADMIN — Medication 0.5 MCG/KG/MIN: at 17:45

## 2020-01-01 RX ADMIN — SODIUM CHLORIDE 0.17 MCG/KG/MIN: 9 INJECTION, SOLUTION INTRAVENOUS at 13:11

## 2020-01-01 RX ADMIN — LORAZEPAM 1 MG: 2 INJECTION INTRAMUSCULAR; INTRAVENOUS at 21:59

## 2020-01-01 RX ADMIN — SODIUM CHLORIDE 3 G: 9 INJECTION, SOLUTION INTRAVENOUS at 08:27

## 2020-01-01 RX ADMIN — ROSUVASTATIN CALCIUM 20 MG: 10 TABLET, FILM COATED ORAL at 08:30

## 2020-01-01 RX ADMIN — AMIODARONE HYDROCHLORIDE 150 MG: 1.5 INJECTION, SOLUTION INTRAVENOUS at 22:55

## 2020-01-01 RX ADMIN — VECURONIUM BROMIDE 5 MG: 1 INJECTION, POWDER, LYOPHILIZED, FOR SOLUTION INTRAVENOUS at 20:33

## 2020-01-01 RX ADMIN — ALLOPURINOL 100 MG: 100 TABLET ORAL at 21:56

## 2020-01-01 RX ADMIN — SODIUM CHLORIDE 1 MCG/KG/HR: 9 INJECTION, SOLUTION INTRAVENOUS at 21:36

## 2020-01-01 RX ADMIN — METOCLOPRAMIDE HYDROCHLORIDE 10 MG: 5 INJECTION INTRAMUSCULAR; INTRAVENOUS at 06:10

## 2020-01-01 RX ADMIN — POLYETHYLENE GLYCOL 3350 17 G: 17 POWDER, FOR SOLUTION ORAL at 08:30

## 2020-01-01 RX ADMIN — Medication 10 ML: at 20:14

## 2020-01-01 RX ADMIN — FENTANYL CITRATE 250 MCG: 50 INJECTION, SOLUTION INTRAMUSCULAR; INTRAVENOUS at 20:46

## 2020-01-01 RX ADMIN — SODIUM CHLORIDE 0.04 MCG/KG/MIN: 9 INJECTION, SOLUTION INTRAVENOUS at 13:12

## 2020-01-01 RX ADMIN — ALBUMIN HUMAN: 0.05 INJECTION, SOLUTION INTRAVENOUS at 23:45

## 2020-01-01 RX ADMIN — SODIUM CHLORIDE 0.5 MCG/KG/HR: 900 INJECTION INTRAVENOUS at 19:32

## 2020-01-01 RX ADMIN — MAGNESIUM SULFATE HEPTAHYDRATE 2 G: 40 INJECTION, SOLUTION INTRAVENOUS at 07:55

## 2020-01-01 RX ADMIN — CHLORHEXIDINE GLUCONATE 0.12% ORAL RINSE 15 ML: 1.2 LIQUID ORAL at 20:13

## 2020-01-01 RX ADMIN — ROSUVASTATIN CALCIUM 20 MG: 10 TABLET, FILM COATED ORAL at 08:29

## 2020-01-01 RX ADMIN — VECURONIUM BROMIDE 5 MG: 1 INJECTION, POWDER, LYOPHILIZED, FOR SOLUTION INTRAVENOUS at 21:47

## 2020-01-01 RX ADMIN — SODIUM CHLORIDE 3 G: 9 INJECTION, SOLUTION INTRAVENOUS at 15:03

## 2020-01-01 RX ADMIN — SODIUM CHLORIDE 1.5 MCG/KG/HR: 9 INJECTION, SOLUTION INTRAVENOUS at 18:02

## 2020-01-01 RX ADMIN — MAGNESIUM SULFATE HEPTAHYDRATE 1 G: 1 INJECTION, SOLUTION INTRAVENOUS at 08:29

## 2020-01-01 RX ADMIN — ACETAMINOPHEN 650 MG: 325 TABLET, FILM COATED ORAL at 12:07

## 2020-01-01 RX ADMIN — ALBUMIN HUMAN 250 ML: 0.05 INJECTION, SOLUTION INTRAVENOUS at 01:43

## 2020-01-01 RX ADMIN — Medication 5 MG: at 19:51

## 2020-01-01 RX ADMIN — AMIODARONE HYDROCHLORIDE 1 MG/MIN: 50 INJECTION, SOLUTION INTRAVENOUS at 21:41

## 2020-01-01 RX ADMIN — MIDAZOLAM HYDROCHLORIDE 2 MG: 1 INJECTION, SOLUTION INTRAMUSCULAR; INTRAVENOUS at 22:47

## 2020-01-01 RX ADMIN — DOCUSATE SODIUM 50 MG AND SENNOSIDES 8.6 MG 2 TABLET: 8.6; 5 TABLET, FILM COATED ORAL at 08:30

## 2020-01-01 RX ADMIN — MIDAZOLAM HYDROCHLORIDE 2 MG: 1 INJECTION, SOLUTION INTRAMUSCULAR; INTRAVENOUS at 20:46

## 2020-01-01 RX ADMIN — MAGNESIUM SULFATE HEPTAHYDRATE 1 G: 1 INJECTION, SOLUTION INTRAVENOUS at 01:44

## 2020-01-01 RX ADMIN — Medication 10 ML: at 07:55

## 2020-01-01 RX ADMIN — SODIUM CHLORIDE 0.5 MCG/KG/HR: 9 INJECTION, SOLUTION INTRAVENOUS at 01:55

## 2020-01-01 RX ADMIN — SODIUM CHLORIDE 0.05 MCG/KG/MIN: 9 INJECTION, SOLUTION INTRAVENOUS at 10:01

## 2020-01-01 RX ADMIN — SODIUM CHLORIDE 1.5 MCG/KG/HR: 9 INJECTION, SOLUTION INTRAVENOUS at 12:53

## 2020-01-01 RX ADMIN — HEPARIN SODIUM 7.8 UNITS/KG/HR: 10000 INJECTION, SOLUTION INTRAVENOUS at 22:03

## 2020-01-01 RX ADMIN — SODIUM CHLORIDE 500 ML: 9 INJECTION, SOLUTION INTRAVENOUS at 07:55

## 2020-01-01 RX ADMIN — SODIUM BICARBONATE 100 MEQ: 84 INJECTION, SOLUTION INTRAVENOUS at 01:49

## 2020-01-01 RX ADMIN — SODIUM CHLORIDE 1.2 MCG/KG/HR: 9 INJECTION, SOLUTION INTRAVENOUS at 01:01

## 2020-01-01 RX ADMIN — SODIUM CHLORIDE 1 MCG/KG/HR: 9 INJECTION, SOLUTION INTRAVENOUS at 18:17

## 2020-01-01 RX ADMIN — POLYETHYLENE GLYCOL 3350 17 G: 17 POWDER, FOR SOLUTION ORAL at 20:13

## 2020-01-01 RX ADMIN — SODIUM CHLORIDE 6 UNITS/HR: 900 INJECTION INTRAVENOUS at 19:33

## 2020-01-01 RX ADMIN — CALCIUM GLUCONATE 1 G: 20 INJECTION, SOLUTION INTRAVENOUS at 08:28

## 2020-12-28 PROBLEM — I50.9 ACUTE EXACERBATION OF CHF (CONGESTIVE HEART FAILURE) (HCC): Status: ACTIVE | Noted: 2020-01-01

## 2020-12-28 PROBLEM — N28.9 ACUTE RENAL INSUFFICIENCY: Status: ACTIVE | Noted: 2020-01-01

## 2020-12-28 PROBLEM — R07.9 CHEST PAIN: Status: ACTIVE | Noted: 2020-01-01

## 2020-12-28 PROBLEM — I21.4 NON-ST ELEVATION MYOCARDIAL INFARCTION (NSTEMI) (HCC): Status: ACTIVE | Noted: 2020-01-01

## 2020-12-29 PROBLEM — I10 ESSENTIAL HYPERTENSION: Status: ACTIVE | Noted: 2020-01-01

## 2020-12-29 PROBLEM — I21.4 NSTEMI (NON-ST ELEVATED MYOCARDIAL INFARCTION) (HCC): Status: ACTIVE | Noted: 2020-01-01

## 2020-12-29 PROBLEM — I25.10 CORONARY ARTERY DISEASE INVOLVING NATIVE CORONARY ARTERY OF NATIVE HEART WITHOUT ANGINA PECTORIS: Status: ACTIVE | Noted: 2020-01-01

## 2020-12-29 PROBLEM — I25.110 CORONARY ARTERY DISEASE INVOLVING NATIVE CORONARY ARTERY OF NATIVE HEART WITH UNSTABLE ANGINA PECTORIS (HCC): Status: ACTIVE | Noted: 2020-01-01

## 2020-12-29 NOTE — ANESTHESIA PREPROCEDURE EVALUATION
Anesthesia Evaluation     Patient summary reviewed and Nursing notes reviewed   NPO Solid Status: > 8 hours  NPO Liquid Status: > 8 hours           Airway   Mallampati: I  TM distance: >3 FB  Neck ROM: full  No difficulty expected  Dental - normal exam     Pulmonary - negative pulmonary ROS and normal exam   Cardiovascular - normal exam    (+) hypertension, past MI , CAD, angina, hyperlipidemia,       Neuro/Psych- negative ROS  GI/Hepatic/Renal/Endo    (+) obesity, morbid obesity,  renal disease,     Musculoskeletal (-) negative ROS    Abdominal  - normal exam    Bowel sounds: normal.   Substance History - negative use     OB/GYN negative ob/gyn ROS         Other            Phys Exam Other: DENTITIA IN VERY POOR CONDITION.  MISSING MOST OF UPPER INCISORS.  REMAINING WITH MOD/SEVERE DECAY.  NONE GROSSLY LOSSE.  LOWER INCISORS WITH SEVERE DECAY SOME LOOSE.  ADVISED OF POSSIBLE TOOTH LOOSENING OR LOSS.              Anesthesia Plan    ASA 4 - emergent     general     intravenous induction     Anesthetic plan, all risks, benefits, and alternatives have been provided, discussed and informed consent has been obtained with: patient.

## 2020-12-30 NOTE — ANESTHESIA POSTPROCEDURE EVALUATION
Patient: Paxton Wilkerson    Procedure Summary     Date: 12/29/20 Room / Location:  [facility]    Anesthesia Start: 1903 Anesthesia Stop: 12/30/20 0104    Procedure: CORONARY ARTERY BYPASS WITH INTERNAL MAMMARY ARTERY GRAFT AND MITRAL VALVE REPAIR (N/A Chest) Diagnosis:       Coronary artery disease involving native coronary artery of native heart with unstable angina pectoris (CMS/HCC)      (Coronary artery disease involving native coronary artery of native heart with unstable angina pectoris (CMS/HCC) [I25.110])    Surgeon: Jr Keyur Calderon MD Provider: Alphonse Lewis MD    Anesthesia Type: general ASA Status: 4 - Emergent          Anesthesia Type: general    Vitals  No vitals data found for the desired time range.          Post Anesthesia Care and Evaluation    Patient location during evaluation: ICU  Patient participation: complete - patient cannot participate  Level of consciousness: obtunded/minimal responses  Pain scale: See nurse's notes for pain score.  Pain management: adequate  Airway patency: patent  Anesthetic complications: No anesthetic complications  PONV Status: none  Cardiovascular status: acceptable  Respiratory status: acceptable  Hydration status: acceptable    Comments: Patient seen and examined postoperatively; vital signs stable; SpO2 greater than or equal to 90%; cardiopulmonary status stable; nausea/vomiting adequately controlled; pain adequately controlled; no apparent anesthesia complications; patient discharged from anesthesia care when discharge criteria were met. Sedated on Vent. Hemodynamically stable.

## 2020-12-30 NOTE — ANESTHESIA PROCEDURE NOTES
Central Line      Patient location during procedure: OR  Start time: 12/29/2020 7:15 PM  Stop Time:12/29/2020 7:25 PM  Indications: central pressure monitoring, vascular access and MD/Surgeon request  Staff  Anesthesiologist: Alphonse Lewis MD  Preanesthetic Checklist  Completed: patient identified, site marked, surgical consent, pre-op evaluation, timeout performed, IV checked, risks and benefits discussed and monitors and equipment checked  Central Line Prep  Sterile Tech:gloves, cap, gown, mask and sterile barriers  Prep: chloraprep  Patient monitoring: blood pressure monitoring, continuous pulse oximetry and EKG  Central Line Procedure  Laterality:right  Location:internal jugular  Catheter Type:Cordis and double lumen  Catheter Size:9 Fr  Guidance:ultrasound guided  PROCEDURE NOTE/ULTRASOUND INTERPRETATION.  Using ultrasound guidance the potential vascular sites for insertion of the catheter were visualized to determine the patency of the vessel to be used for vascular access.  After selecting the appropriate site for insertion, the needle was visualized under ultrasound being inserted into the internal jugular vein, followed by ultrasound confirmation of wire and catheter placement. There were no abnormalities seen on ultrasound; an image was taken; and the patient tolerated the procedure with no complications. Images: still images obtained, printed/placed on chart  Assessment  Post procedure:biopatch applied, line sutured and occlusive dressing applied  Assessement:blood return through all ports, free fluid flow and chest x-ray ordered  Complications:no  Patient Tolerance:patient tolerated the procedure well with no apparent complications  Additional Notes  RIJ MAC VIA ASEPTIC SELDINGER TECH THRU ANTERIOR APPROACH US GUIDANCE. X1 WITH EASE

## 2020-12-30 NOTE — ANESTHESIA PROCEDURE NOTES
Airway  Urgency: elective    Date/Time: 12/29/2020 7:12 PM  End Time:12/29/2020 7:12 PM  Airway not difficult    General Information and Staff    Patient location during procedure: OR  Anesthesiologist: Alphonse Lewis MD    Indications and Patient Condition  Indications for airway management: airway protection    Preoxygenated: yes  MILS maintained throughout  Mask difficulty assessment: 1 - vent by mask    Final Airway Details  Final airway type: endotracheal airway      Successful airway: ETT  Cuffed: yes   Successful intubation technique: direct laryngoscopy  Endotracheal tube insertion site: oral  Blade: Nerissa  Blade size: 4  ETT size (mm): 8.0  Cormack-Lehane Classification: grade I - full view of glottis  Placement verified by: chest auscultation and capnometry   Measured from: teeth  ETT/EBT  to teeth (cm): 24  Number of attempts at approach: 1  Assessment: lips, teeth, and gum same as pre-op and atraumatic intubation    Additional Comments  X1 WITH EASE. ATRAUMATIC.  TEETH IN PREOP CONDITION.  CUFF TO MINIMUM OCCLUSIVE CUFF PRESSURE. POS ETCO2. BS=BS. GEMA BITE BLOCK

## 2020-12-30 NOTE — ANESTHESIA PROCEDURE NOTES
Central Line      Patient location during procedure: OR  Start time: 12/29/2020 7:25 PM  Stop Time:12/29/2020 7:27 PM  Indications: central pressure monitoring, vascular access and MD/Surgeon request  Staff  Anesthesiologist: Alphonse Lewis MD  Preanesthetic Checklist  Completed: patient identified, site marked, surgical consent, pre-op evaluation, timeout performed, IV checked, risks and benefits discussed and monitors and equipment checked  Central Line Prep  Sterile Tech:cap, gloves, gown, sterile barriers and mask  Prep: chloraprep  Central Line Procedure  Laterality:right  Location:internal jugular  Catheter Type:Pierce-Amada  Additional Notes  X1 THRU PREVIOUSLY PLACED CORDIS. NEW GLOVES/DRAPE.  ANDREWAN ADVANCED TO RA.  WILL FLOAT TO PA POST REVASCULARIZATION

## 2021-01-01 ENCOUNTER — APPOINTMENT (OUTPATIENT)
Dept: GENERAL RADIOLOGY | Facility: HOSPITAL | Age: 76
End: 2021-01-01

## 2021-01-01 VITALS
HEART RATE: 60 BPM | SYSTOLIC BLOOD PRESSURE: 136 MMHG | OXYGEN SATURATION: 69 % | DIASTOLIC BLOOD PRESSURE: 55 MMHG | TEMPERATURE: 98.4 F | RESPIRATION RATE: 17 BRPM | HEIGHT: 68 IN | WEIGHT: 315 LBS | BODY MASS INDEX: 47.74 KG/M2

## 2021-01-01 LAB
ANION GAP SERPL CALCULATED.3IONS-SCNC: 9 MMOL/L (ref 5–15)
ARTERIAL PATENCY WRIST A: ABNORMAL
ATMOSPHERIC PRESS: ABNORMAL MM[HG]
ATMOSPHERIC PRESS: ABNORMAL MM[HG]
BASE DEFICIT: -2.3 MEQ/LITER
BASE EXCESS BLDA CALC-SCNC: -1.8 MMOL/L (ref 0–3)
BASE EXCESS BLDA CALC-SCNC: -2 MMOL/L (ref 0–3)
BASE EXCESS BLDA CALC-SCNC: -5.9 MMOL/L (ref 0–3)
BDY SITE: ABNORMAL
BUN SERPL-MCNC: 36 MG/DL (ref 8–23)
BUN/CREAT SERPL: 25.2 (ref 7–25)
CA-I BLDA-SCNC: ABNORMAL MMOL/L
CA-I SERPL ISE-MCNC: 1.18 MMOL/L (ref 1.2–1.3)
CALCIUM SPEC-SCNC: 8.3 MG/DL (ref 8.6–10.5)
CHLORIDE SERPL-SCNC: 115 MMOL/L (ref 98–107)
CO2 BLDA-SCNC: 15.6 MMOL/L (ref 22–29)
CO2 BLDA-SCNC: 23.1 MMOL/L (ref 22–29)
CO2 BLDA-SCNC: 23.4 MMOL/L (ref 22–29)
CO2 SERPL-SCNC: 21 MMOL/L (ref 22–29)
CREAT SERPL-MCNC: 1.43 MG/DL (ref 0.76–1.27)
D-LACTATE SERPL-SCNC: 3.7 MMOL/L (ref 0.5–2)
DEPRECATED RDW RBC AUTO: 46.4 FL (ref 37–54)
ERYTHROCYTE [DISTWIDTH] IN BLOOD BY AUTOMATED COUNT: 15.2 % (ref 12.3–15.4)
GFR SERPL CREATININE-BSD FRML MDRD: 48 ML/MIN/1.73
GLUCOSE BLDC GLUCOMTR-MCNC: 102 MG/DL (ref 74–100)
GLUCOSE BLDC GLUCOMTR-MCNC: 102 MG/DL (ref 74–100)
GLUCOSE BLDC GLUCOMTR-MCNC: 116 MG/DL (ref 70–105)
GLUCOSE BLDC GLUCOMTR-MCNC: 117 MG/DL (ref 74–100)
GLUCOSE BLDC GLUCOMTR-MCNC: 118 MG/DL (ref 70–105)
GLUCOSE BLDC GLUCOMTR-MCNC: 120 MG/DL (ref 70–105)
GLUCOSE BLDC GLUCOMTR-MCNC: 122 MG/DL (ref 70–105)
GLUCOSE BLDC GLUCOMTR-MCNC: 122 MG/DL (ref 70–105)
GLUCOSE BLDC GLUCOMTR-MCNC: 123 MG/DL (ref 70–105)
GLUCOSE BLDC GLUCOMTR-MCNC: 123 MG/DL (ref 70–105)
GLUCOSE BLDC GLUCOMTR-MCNC: 124 MG/DL (ref 70–105)
GLUCOSE SERPL-MCNC: 113 MG/DL (ref 65–99)
HCO3 BLDA-SCNC: 15.2 MMOL/L (ref 21–28)
HCO3 BLDA-SCNC: 22.1 MMOL/L (ref 21–28)
HCO3 MIXED: 22.3 MMOL/L (ref 21–29)
HCT VFR BLD AUTO: 25.6 % (ref 37.5–51)
HCT VFR BLDA CALC: 17 % (ref 38–51)
HEMODILUTION: NO
HGB BLD-MCNC: 8.5 G/DL (ref 13–17.7)
HGB BLDA-MCNC: 5.9 G/DL (ref 12–17)
INHALED O2 CONCENTRATION: 100 %
INHALED O2 CONCENTRATION: 40 %
INHALED O2 CONCENTRATION: 40 %
MAGNESIUM SERPL-MCNC: 3 MG/DL (ref 1.6–2.4)
MCH RBC QN AUTO: 28.9 PG (ref 26.6–33)
MCHC RBC AUTO-ENTMCNC: 33 G/DL (ref 31.5–35.7)
MCV RBC AUTO: 87.5 FL (ref 79–97)
MODALITY: ABNORMAL
O2 SATURATION MIXED: 62 %
PCO2 BLDA: 15.5 MM HG (ref 35–48)
PCO2 BLDA: 32.8 MM HG (ref 35–48)
PCO2 MIXED: 35.4 MMHG (ref 35–51)
PEEP RESPIRATORY: 5 CM[H2O]
PEEP RESPIRATORY: 5 CM[H2O]
PH BLDA: 7.43 PH UNITS (ref 7.35–7.45)
PH BLDA: 7.6 PH UNITS (ref 7.35–7.45)
PH MIXED: 7.41 PH UNITS (ref 7.32–7.45)
PLATELET # BLD AUTO: 131 10*3/MM3 (ref 140–450)
PMV BLD AUTO: 7.3 FL (ref 6–12)
PO2 BLDA: 102.1 MM HG (ref 83–108)
PO2 BLDA: 443.9 MM HG (ref 83–108)
PO2 MIXED: 31.7 MMHG
POTASSIUM BLDA-SCNC: 4.4 MMOL/L (ref 3.5–4.5)
POTASSIUM SERPL-SCNC: 3.5 MMOL/L (ref 3.5–5.2)
POTASSIUM SERPL-SCNC: 3.5 MMOL/L (ref 3.5–5.2)
RBC # BLD AUTO: 2.93 10*6/MM3 (ref 4.14–5.8)
RESPIRATORY RATE: 16
SAO2 % BLDCOA: 100 % (ref 94–98)
SAO2 % BLDCOA: 98.2 % (ref 94–98)
SET MECH RESP RATE: 16
SODIUM BLD-SCNC: 151 MMOL/L (ref 138–146)
SODIUM SERPL-SCNC: 145 MMOL/L (ref 136–145)
VENTILATOR MODE: ABNORMAL
VENTILATOR MODE: ABNORMAL
VT ON VENT VENT: 600 ML
WBC # BLD AUTO: 14.5 10*3/MM3 (ref 3.4–10.8)

## 2021-01-01 PROCEDURE — 94799 UNLISTED PULMONARY SVC/PX: CPT

## 2021-01-01 PROCEDURE — 82330 ASSAY OF CALCIUM: CPT | Performed by: NURSE PRACTITIONER

## 2021-01-01 PROCEDURE — 71045 X-RAY EXAM CHEST 1 VIEW: CPT

## 2021-01-01 PROCEDURE — 93005 ELECTROCARDIOGRAM TRACING: CPT | Performed by: THORACIC SURGERY (CARDIOTHORACIC VASCULAR SURGERY)

## 2021-01-01 PROCEDURE — 25010000003 POTASSIUM CHLORIDE 10 MEQ/100ML SOLUTION: Performed by: THORACIC SURGERY (CARDIOTHORACIC VASCULAR SURGERY)

## 2021-01-01 PROCEDURE — 25010000002 ENOXAPARIN PER 10 MG: Performed by: NURSE PRACTITIONER

## 2021-01-01 PROCEDURE — 25010000002 CALCIUM GLUCONATE 2-0.675 GM/100ML-% SOLUTION: Performed by: NURSE PRACTITIONER

## 2021-01-01 PROCEDURE — 25010000002 AMIODARONE PER 30 MG: Performed by: NURSE PRACTITIONER

## 2021-01-01 PROCEDURE — 82330 ASSAY OF CALCIUM: CPT

## 2021-01-01 PROCEDURE — 25010000002 AMIODARONE PER 30 MG: Performed by: THORACIC SURGERY (CARDIOTHORACIC VASCULAR SURGERY)

## 2021-01-01 PROCEDURE — 63710000001 INSULIN REGULAR HUMAN PER 5 UNITS: Performed by: THORACIC SURGERY (CARDIOTHORACIC VASCULAR SURGERY)

## 2021-01-01 PROCEDURE — 80048 BASIC METABOLIC PNL TOTAL CA: CPT | Performed by: THORACIC SURGERY (CARDIOTHORACIC VASCULAR SURGERY)

## 2021-01-01 PROCEDURE — 25010000002 MAGNESIUM SULFATE PER 500 MG OF MAGNESIUM: Performed by: NURSE PRACTITIONER

## 2021-01-01 PROCEDURE — 25010000002 EPINEPHRINE 30 MG/30ML SOLUTION 30 ML VIAL: Performed by: THORACIC SURGERY (CARDIOTHORACIC VASCULAR SURGERY)

## 2021-01-01 PROCEDURE — 25010000002 MAGNESIUM SULFATE 2 GM/50ML SOLUTION: Performed by: NURSE PRACTITIONER

## 2021-01-01 PROCEDURE — 25010000002 MORPHINE PER 10 MG: Performed by: THORACIC SURGERY (CARDIOTHORACIC VASCULAR SURGERY)

## 2021-01-01 PROCEDURE — 99291 CRITICAL CARE FIRST HOUR: CPT | Performed by: INTERNAL MEDICINE

## 2021-01-01 PROCEDURE — 25010000002 EPINEPHRINE 1 MG/10ML SOLUTION PREFILLED SYRINGE: Performed by: NURSE PRACTITIONER

## 2021-01-01 PROCEDURE — 94003 VENT MGMT INPAT SUBQ DAY: CPT

## 2021-01-01 PROCEDURE — 99233 SBSQ HOSP IP/OBS HIGH 50: CPT | Performed by: INTERNAL MEDICINE

## 2021-01-01 PROCEDURE — 87040 BLOOD CULTURE FOR BACTERIA: CPT | Performed by: NURSE PRACTITIONER

## 2021-01-01 PROCEDURE — 25010000002 ALBUMIN HUMAN 25% PER 50 ML

## 2021-01-01 PROCEDURE — 85018 HEMOGLOBIN: CPT

## 2021-01-01 PROCEDURE — 80051 ELECTROLYTE PANEL: CPT

## 2021-01-01 PROCEDURE — 82962 GLUCOSE BLOOD TEST: CPT

## 2021-01-01 PROCEDURE — 92950 HEART/LUNG RESUSCITATION CPR: CPT

## 2021-01-01 PROCEDURE — 25010000002 AMIODARONE IN DEXTROSE 5% 150-4.21 MG/100ML-% SOLUTION: Performed by: THORACIC SURGERY (CARDIOTHORACIC VASCULAR SURGERY)

## 2021-01-01 PROCEDURE — 82803 BLOOD GASES ANY COMBINATION: CPT

## 2021-01-01 PROCEDURE — 25010000002 ADENOSINE PER 6 MG: Performed by: NURSE PRACTITIONER

## 2021-01-01 PROCEDURE — P9041 ALBUMIN (HUMAN),5%, 50ML: HCPCS | Performed by: THORACIC SURGERY (CARDIOTHORACIC VASCULAR SURGERY)

## 2021-01-01 PROCEDURE — 85027 COMPLETE CBC AUTOMATED: CPT | Performed by: THORACIC SURGERY (CARDIOTHORACIC VASCULAR SURGERY)

## 2021-01-01 PROCEDURE — 25010000002 LIDOCAINE PER 10 MG: Performed by: INTERNAL MEDICINE

## 2021-01-01 PROCEDURE — 93010 ELECTROCARDIOGRAM REPORT: CPT | Performed by: INTERNAL MEDICINE

## 2021-01-01 PROCEDURE — 83605 ASSAY OF LACTIC ACID: CPT

## 2021-01-01 PROCEDURE — 25010000003 AMPICILLIN-SULBACTAM PER 1.5 G: Performed by: INTERNAL MEDICINE

## 2021-01-01 PROCEDURE — 25010000002 ALBUMIN HUMAN 5% PER 50 ML: Performed by: THORACIC SURGERY (CARDIOTHORACIC VASCULAR SURGERY)

## 2021-01-01 PROCEDURE — 25010000002 CEFAZOLIN PER 500 MG: Performed by: NURSE PRACTITIONER

## 2021-01-01 PROCEDURE — 25010000002 METOCLOPRAMIDE PER 10 MG: Performed by: NURSE PRACTITIONER

## 2021-01-01 PROCEDURE — 25010000002 VANCOMYCIN 10 G RECONSTITUTED SOLUTION: Performed by: THORACIC SURGERY (CARDIOTHORACIC VASCULAR SURGERY)

## 2021-01-01 PROCEDURE — 25010000003 MILRINONE LACTATE IN DEXTROSE 20-5 MG/100ML-% SOLUTION: Performed by: THORACIC SURGERY (CARDIOTHORACIC VASCULAR SURGERY)

## 2021-01-01 PROCEDURE — P9047 ALBUMIN (HUMAN), 25%, 50ML: HCPCS

## 2021-01-01 PROCEDURE — 84132 ASSAY OF SERUM POTASSIUM: CPT | Performed by: INTERNAL MEDICINE

## 2021-01-01 PROCEDURE — 83735 ASSAY OF MAGNESIUM: CPT | Performed by: THORACIC SURGERY (CARDIOTHORACIC VASCULAR SURGERY)

## 2021-01-01 PROCEDURE — 25010000002 LORAZEPAM PER 2 MG: Performed by: INTERNAL MEDICINE

## 2021-01-01 RX ORDER — LIDOCAINE HYDROCHLORIDE ANHYDROUS AND DEXTROSE MONOHYDRATE 5; 400 G/100ML; MG/100ML
2 INJECTION, SOLUTION INTRAVENOUS CONTINUOUS
Status: DISCONTINUED | OUTPATIENT
Start: 2021-01-01 | End: 2021-01-01 | Stop reason: HOSPADM

## 2021-01-01 RX ORDER — CALCIUM GLUCONATE 20 MG/ML
2 INJECTION, SOLUTION INTRAVENOUS ONCE
Status: COMPLETED | OUTPATIENT
Start: 2021-01-01 | End: 2021-01-01

## 2021-01-01 RX ORDER — ADENOSINE 3 MG/ML
INJECTION, SOLUTION INTRAVENOUS
Status: COMPLETED | OUTPATIENT
Start: 2021-01-01 | End: 2021-01-01

## 2021-01-01 RX ORDER — NICOTINE POLACRILEX 4 MG
15 LOZENGE BUCCAL
Status: DISCONTINUED | OUTPATIENT
Start: 2021-01-01 | End: 2021-01-01 | Stop reason: HOSPADM

## 2021-01-01 RX ORDER — CALCIUM CHLORIDE 100 MG/ML
INJECTION INTRAVENOUS; INTRAVENTRICULAR
Status: COMPLETED | OUTPATIENT
Start: 2021-01-01 | End: 2021-01-01

## 2021-01-01 RX ORDER — INSULIN LISPRO 100 [IU]/ML
0-14 INJECTION, SOLUTION INTRAVENOUS; SUBCUTANEOUS EVERY 6 HOURS SCHEDULED
Status: DISCONTINUED | OUTPATIENT
Start: 2021-01-01 | End: 2021-01-01 | Stop reason: HOSPADM

## 2021-01-01 RX ORDER — EPINEPHRINE 0.1 MG/ML
SYRINGE (ML) INJECTION
Status: COMPLETED | OUTPATIENT
Start: 2021-01-01 | End: 2021-01-01

## 2021-01-01 RX ORDER — POTASSIUM CHLORIDE 1.5 G/1.77G
40 POWDER, FOR SOLUTION ORAL ONCE
Status: COMPLETED | OUTPATIENT
Start: 2021-01-01 | End: 2021-01-01

## 2021-01-01 RX ORDER — DEXTROSE MONOHYDRATE 25 G/50ML
25 INJECTION, SOLUTION INTRAVENOUS
Status: DISCONTINUED | OUTPATIENT
Start: 2021-01-01 | End: 2021-01-01 | Stop reason: HOSPADM

## 2021-01-01 RX ORDER — BUMETANIDE 0.25 MG/ML
1 INJECTION INTRAMUSCULAR; INTRAVENOUS ONCE
Status: COMPLETED | OUTPATIENT
Start: 2021-01-01 | End: 2021-01-01

## 2021-01-01 RX ORDER — METOCLOPRAMIDE HYDROCHLORIDE 5 MG/ML
10 INJECTION INTRAMUSCULAR; INTRAVENOUS EVERY 6 HOURS
Status: DISCONTINUED | OUTPATIENT
Start: 2021-01-01 | End: 2021-01-01

## 2021-01-01 RX ORDER — MAGNESIUM SULFATE HEPTAHYDRATE 40 MG/ML
2 INJECTION, SOLUTION INTRAVENOUS ONCE
Status: COMPLETED | OUTPATIENT
Start: 2021-01-01 | End: 2021-01-01

## 2021-01-01 RX ORDER — ALBUMIN (HUMAN) 12.5 G/50ML
25 SOLUTION INTRAVENOUS ONCE
Status: COMPLETED | OUTPATIENT
Start: 2021-01-01 | End: 2021-01-01

## 2021-01-01 RX ORDER — MAGNESIUM SULFATE HEPTAHYDRATE 500 MG/ML
INJECTION, SOLUTION INTRAMUSCULAR; INTRAVENOUS
Status: COMPLETED | OUTPATIENT
Start: 2021-01-01 | End: 2021-01-01

## 2021-01-01 RX ORDER — AMIODARONE HYDROCHLORIDE 50 MG/ML
INJECTION, SOLUTION INTRAVENOUS
Status: COMPLETED | OUTPATIENT
Start: 2021-01-01 | End: 2021-01-01

## 2021-01-01 RX ORDER — ALBUMIN, HUMAN INJ 5% 5 %
250 SOLUTION INTRAVENOUS ONCE
Status: COMPLETED | OUTPATIENT
Start: 2021-01-01 | End: 2021-01-01

## 2021-01-01 RX ORDER — INSULIN LISPRO 100 [IU]/ML
0-14 INJECTION, SOLUTION INTRAVENOUS; SUBCUTANEOUS AS NEEDED
Status: DISCONTINUED | OUTPATIENT
Start: 2021-01-01 | End: 2021-01-01 | Stop reason: HOSPADM

## 2021-01-01 RX ORDER — ALBUMIN (HUMAN) 12.5 G/50ML
SOLUTION INTRAVENOUS
Status: COMPLETED
Start: 2021-01-01 | End: 2021-01-01

## 2021-01-01 RX ADMIN — POTASSIUM CHLORIDE 10 MEQ: 7.46 INJECTION, SOLUTION INTRAVENOUS at 15:26

## 2021-01-01 RX ADMIN — SODIUM CHLORIDE 1.5 MCG/KG/HR: 9 INJECTION, SOLUTION INTRAVENOUS at 16:53

## 2021-01-01 RX ADMIN — SODIUM CHLORIDE 1.5 MCG/KG/HR: 9 INJECTION, SOLUTION INTRAVENOUS at 08:36

## 2021-01-01 RX ADMIN — CALCIUM CHLORIDE 1 G: 100 INJECTION INTRAVENOUS; INTRAVENTRICULAR at 17:11

## 2021-01-01 RX ADMIN — MORPHINE SULFATE 2 MG: 4 INJECTION INTRAVENOUS at 14:36

## 2021-01-01 RX ADMIN — CHLORHEXIDINE GLUCONATE 0.12% ORAL RINSE 15 ML: 1.2 LIQUID ORAL at 08:34

## 2021-01-01 RX ADMIN — METOCLOPRAMIDE HYDROCHLORIDE 10 MG: 5 INJECTION INTRAMUSCULAR; INTRAVENOUS at 09:35

## 2021-01-01 RX ADMIN — LORAZEPAM 1 MG: 2 INJECTION INTRAMUSCULAR; INTRAVENOUS at 11:49

## 2021-01-01 RX ADMIN — MAGNESIUM SULFATE HEPTAHYDRATE 2 G: 40 INJECTION, SOLUTION INTRAVENOUS at 09:35

## 2021-01-01 RX ADMIN — POLYETHYLENE GLYCOL 3350 17 G: 17 POWDER, FOR SOLUTION ORAL at 08:33

## 2021-01-01 RX ADMIN — LORAZEPAM 1 MG: 2 INJECTION INTRAMUSCULAR; INTRAVENOUS at 02:07

## 2021-01-01 RX ADMIN — Medication 10 ML: at 08:34

## 2021-01-01 RX ADMIN — CALCIUM GLUCONATE 2 G: 20 INJECTION, SOLUTION INTRAVENOUS at 09:35

## 2021-01-01 RX ADMIN — LIDOCAINE HYDROCHLORIDE 100 MG: 20 INJECTION INTRAVENOUS at 17:10

## 2021-01-01 RX ADMIN — LIDOCAINE HYDROCHLORIDE 100 MG: 20 INJECTION INTRAVENOUS at 17:26

## 2021-01-01 RX ADMIN — SODIUM CHLORIDE 3.6 UNITS/HR: 9 INJECTION, SOLUTION INTRAVENOUS at 02:12

## 2021-01-01 RX ADMIN — CALCIUM CHLORIDE 1 G: 100 INJECTION INTRAVENOUS; INTRAVENTRICULAR at 17:12

## 2021-01-01 RX ADMIN — SODIUM CHLORIDE 1.5 MCG/KG/HR: 9 INJECTION, SOLUTION INTRAVENOUS at 06:32

## 2021-01-01 RX ADMIN — LIDOCAINE HYDROCHLORIDE ANHYDROUS AND DEXTROSE MONOHYDRATE 2 MG/MIN: .4; 5 INJECTION, SOLUTION INTRAVENOUS at 16:45

## 2021-01-01 RX ADMIN — SODIUM CHLORIDE 0.03 UNITS/MIN: 9 INJECTION, SOLUTION INTRAVENOUS at 11:50

## 2021-01-01 RX ADMIN — POTASSIUM CHLORIDE 10 MEQ: 10 INJECTION, SOLUTION INTRAVENOUS at 05:33

## 2021-01-01 RX ADMIN — POTASSIUM CHLORIDE 40 MEQ: 1.5 POWDER, FOR SOLUTION ORAL at 16:19

## 2021-01-01 RX ADMIN — AMIODARONE HYDROCHLORIDE 150 MG: 1.5 INJECTION, SOLUTION INTRAVENOUS at 03:40

## 2021-01-01 RX ADMIN — Medication 1 MG: at 17:15

## 2021-01-01 RX ADMIN — ACETAMINOPHEN ORAL SOLUTION 650 MG: 650 SOLUTION ORAL at 01:27

## 2021-01-01 RX ADMIN — ROSUVASTATIN CALCIUM 20 MG: 10 TABLET, FILM COATED ORAL at 08:33

## 2021-01-01 RX ADMIN — SODIUM CHLORIDE 1.5 MCG/KG/HR: 9 INJECTION, SOLUTION INTRAVENOUS at 14:01

## 2021-01-01 RX ADMIN — SODIUM CHLORIDE 1.5 MCG/KG/HR: 9 INJECTION, SOLUTION INTRAVENOUS at 04:06

## 2021-01-01 RX ADMIN — AMIODARONE HYDROCHLORIDE 1 MG/MIN: 50 INJECTION, SOLUTION INTRAVENOUS at 14:04

## 2021-01-01 RX ADMIN — SODIUM CHLORIDE 1.5 MCG/KG/HR: 9 INJECTION, SOLUTION INTRAVENOUS at 11:49

## 2021-01-01 RX ADMIN — SODIUM CHLORIDE 3 G: 9 INJECTION, SOLUTION INTRAVENOUS at 02:36

## 2021-01-01 RX ADMIN — MAGNESIUM SULFATE HEPTAHYDRATE 2 G: 500 INJECTION, SOLUTION INTRAMUSCULAR; INTRAVENOUS at 17:33

## 2021-01-01 RX ADMIN — MILRINONE LACTATE IN DEXTROSE 0.25 MCG/KG/MIN: 200 INJECTION, SOLUTION INTRAVENOUS at 06:02

## 2021-01-01 RX ADMIN — Medication 1 MG: at 17:24

## 2021-01-01 RX ADMIN — Medication 1 MG: at 17:21

## 2021-01-01 RX ADMIN — Medication 1 MG: at 17:18

## 2021-01-01 RX ADMIN — AMIODARONE HYDROCHLORIDE 150 MG: 50 INJECTION, SOLUTION INTRAVENOUS at 17:38

## 2021-01-01 RX ADMIN — AMIODARONE HYDROCHLORIDE 150 MG: 50 INJECTION, SOLUTION INTRAVENOUS at 17:36

## 2021-01-01 RX ADMIN — Medication 1 MG: at 17:36

## 2021-01-01 RX ADMIN — POTASSIUM CHLORIDE 10 MEQ: 10 INJECTION, SOLUTION INTRAVENOUS at 06:33

## 2021-01-01 RX ADMIN — SODIUM CHLORIDE 0.06 MCG/KG/MIN: 9 INJECTION, SOLUTION INTRAVENOUS at 11:50

## 2021-01-01 RX ADMIN — ENOXAPARIN SODIUM 40 MG: 40 INJECTION SUBCUTANEOUS at 08:32

## 2021-01-01 RX ADMIN — ALBUMIN (HUMAN) 25 G: 12.5 SOLUTION INTRAVENOUS at 16:49

## 2021-01-01 RX ADMIN — Medication 1 MG: at 17:10

## 2021-01-01 RX ADMIN — ASPIRIN 81 MG: 81 TABLET, COATED ORAL at 08:33

## 2021-01-01 RX ADMIN — MILRINONE LACTATE IN DEXTROSE 0.25 MCG/KG/MIN: 200 INJECTION, SOLUTION INTRAVENOUS at 14:01

## 2021-01-01 RX ADMIN — SODIUM CHLORIDE 3 G: 900 INJECTION INTRAVENOUS at 12:32

## 2021-01-01 RX ADMIN — VANCOMYCIN HYDROCHLORIDE 2000 MG: 10 INJECTION, POWDER, LYOPHILIZED, FOR SOLUTION INTRAVENOUS at 14:37

## 2021-01-01 RX ADMIN — AMIODARONE HYDROCHLORIDE 150 MG: 1.5 INJECTION, SOLUTION INTRAVENOUS at 05:30

## 2021-01-01 RX ADMIN — POTASSIUM CHLORIDE 10 MEQ: 7.46 INJECTION, SOLUTION INTRAVENOUS at 08:16

## 2021-01-01 RX ADMIN — Medication 1 MG: at 17:30

## 2021-01-01 RX ADMIN — AMIODARONE HYDROCHLORIDE 1 MG/MIN: 50 INJECTION, SOLUTION INTRAVENOUS at 01:28

## 2021-01-01 RX ADMIN — ALBUMIN HUMAN 250 ML: 0.05 INJECTION, SOLUTION INTRAVENOUS at 01:27

## 2021-01-01 RX ADMIN — POTASSIUM CHLORIDE 10 MEQ: 10 INJECTION, SOLUTION INTRAVENOUS at 04:20

## 2021-01-01 RX ADMIN — MUPIROCIN: 20 OINTMENT TOPICAL at 08:34

## 2021-01-01 RX ADMIN — SODIUM BICARBONATE 50 MEQ: 84 INJECTION, SOLUTION INTRAVENOUS at 17:14

## 2021-01-01 RX ADMIN — FAMOTIDINE 20 MG: 10 INJECTION INTRAVENOUS at 08:33

## 2021-01-01 RX ADMIN — Medication 1 MG: at 17:28

## 2021-01-01 RX ADMIN — MORPHINE SULFATE 2 MG: 4 INJECTION INTRAVENOUS at 00:21

## 2021-01-01 RX ADMIN — ADENOSINE 6 MG: 3 INJECTION, SOLUTION INTRAVENOUS at 17:40

## 2021-01-01 RX ADMIN — ALBUMIN HUMAN 25 G: 0.25 SOLUTION INTRAVENOUS at 16:49

## 2021-01-01 RX ADMIN — MORPHINE SULFATE 2 MG: 4 INJECTION INTRAVENOUS at 05:30

## 2021-01-01 RX ADMIN — BUMETANIDE 1 MG: 0.25 INJECTION, SOLUTION INTRAMUSCULAR; INTRAVENOUS at 16:53

## 2021-01-01 RX ADMIN — SODIUM CHLORIDE 1.5 MCG/KG/HR: 9 INJECTION, SOLUTION INTRAVENOUS at 01:28

## 2021-01-01 RX ADMIN — DOCUSATE SODIUM 50 MG AND SENNOSIDES 8.6 MG 2 TABLET: 8.6; 5 TABLET, FILM COATED ORAL at 08:33

## 2021-01-01 NOTE — PROGRESS NOTES
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  So the amiodarone was discontinued.  He had an episode of ventricular fibrillation and underwent ACLS protocol.  This will not be effective as his heart was so severely damaged there is no way it would recover.  I spoke with the intensivist who will speak with the family.  I think we have done all we can do at this point.

## 2021-01-01 NOTE — PROGRESS NOTES
Cardiology Progress Note      Admiting Physician:  Jr Keyur Calderon MD   LOS: 3 days       Reason For Followup:  Coronary artery disease  Non-STEMI  Cardiogenic shock  Ventricular tachycardia/fibrillation  CABG    Subjective:    Interval History:    Patient seen and examined.  Chart reviewed and labs reviewed.  Patient continues to have fever from possible UTI.  Patient is currently intubated, sedated, chest tube present, Alvarado catheter present, on vasopressors epi, norepiand vasopressin, insulin, IV amiodarone.  Intra-aortic balloon pump.  Patient had postop A. fib is on IV amiodarone.   Temporary pacer wires are present.  Potassium is 3.5, BUN/creatinine are 36/1.43 and hemoglobin is 8.5, platelet count 131      Review of Systems:  Cannot obtain review of systems due to intubated sedated status.    Assessment & Plan    Impressions:  Coronary artery disease status post emergent 6 vessel CABG 12/29/2020  Mitral insufficiency status post mitral valve ring annuloplasty 12/29/2020.  Non-STEMI  Ischemic cardiomyopathy EF 20 to 25%  Ventricular tachycardia/ventricular fibrillation-ischemic triggered  Cardiogenic shock  Intra-aortic balloon pump  Morbid obesity  Renal failure  Hypertensive cardiovascular disease  Congestive heart failure acute systolic  Postop A. Fib  Fever     Recommendations:    Telemetry revealed postop A. Fib  We will continue IV amiodarone and monitor rhythm closely  Continue vasopressors as needed  Patient is benefiting with paced rhythm for cardiac outputs we will continue the same at the current time.  Mechanical ventilation as needed  Monitor electrolytes and replete electrolytes as needed  We will monitor hemoglobin and platelet count closely for routine expected post surgery blood loss anemia.  Monitor renal function.  Discussed with patient's wife at bedside  Further recommendations as patient's course progresses  Discussed with RN taking care of patient to coordinate  care    Objective:    Medication Review:   Scheduled Meds:ampicillin-sulbactam, 3 g, Intravenous, Q6H  aspirin, 81 mg, Oral, Daily  chlorhexidine, 15 mL, Mouth/Throat, Q12H  enoxaparin, 40 mg, Subcutaneous, Q12H  famotidine, 20 mg, Intravenous, Q12H  insulin lispro, 0-14 Units, Subcutaneous, Q6H  mupirocin, , Each Nare, BID  pharmacy, 1 each, Does not apply, Once  Pharmacy to dose vancomycin, , Does not apply, Once  polyethylene glycol, 17 g, Oral, BID  rosuvastatin, 20 mg, Oral, Daily  senna-docusate sodium, 2 tablet, Oral, BID  sodium chloride, 10 mL, Intravenous, Q12H  vancomycin, 20 mg/kg (Adjusted), Intravenous, Once      Continuous Infusions:amiodarone, 1 mg/min, Last Rate: 1 mg/min (01/01/21 0128)  dexmedetomidine, 0.2-1.5 mcg/kg/hr, Last Rate: 1.5 mcg/kg/hr (01/01/21 1149)  EPINEPHrine, 0.02-0.3 mcg/kg/min, Last Rate: 0.06 mcg/kg/min (01/01/21 1150)  insulin, 0-50 Units/hr, Last Rate: 4.2 Units/hr (01/01/21 1030)  milrinone, 0.25-0.75 mcg/kg/min, Last Rate: 0.25 mcg/kg/min (01/01/21 0602)  niCARdipine, 5-15 mg/hr  norepinephrine, 0.02-0.3 mcg/kg/min, Last Rate: 0.04 mcg/kg/min (01/01/21 0509)  phenylephrine, 0.5-3 mcg/kg/min, Last Rate: 0.5 mcg/kg/min (12/29/20 7635)  sodium chloride, 30 mL/hr  vasopressin, 0.02-0.1 Units/min, Last Rate: 0.03 Units/min (01/01/21 1150)      PRN Meds:.•  acetaminophen **OR** acetaminophen **OR** acetaminophen  •  acetaminophen  •  ALPRAZolam  •  aluminum-magnesium hydroxide-simethicone  •  bisacodyl  •  bisacodyl  •  Chlorhexidine Gluconate Cloth  •  cyclobenzaprine  •  dextrose  •  dextrose  •  EPINEPHrine  •  glucagon (human recombinant)  •  HYDROcodone-acetaminophen  •  insulin lispro **AND** insulin lispro  •  insulin  •  ipratropium-albuterol  •  LORazepam  •  magnesium hydroxide  •  magnesium sulfate **OR** magnesium sulfate **OR** magnesium sulfate  •  melatonin  •  milrinone  •  Morphine **AND** naloxone  •  niCARdipine  •  norepinephrine  •  ondansetron **OR**  ondansetron  •  oxyCODONE  •  potassium chloride **OR** potassium chloride  •  [COMPLETED] Insert peripheral IV **AND** sodium chloride  •  sodium chloride  •  sodium chloride  •  sodium chloride  •  sodium chloride  •  vasopressin    Patient Active Problem List   Diagnosis   • Chest pain   • Acute exacerbation of CHF (congestive heart failure) (CMS/Formerly [facility])   • Non-ST elevation myocardial infarction (NSTEMI) (CMS/Formerly [facility])   • Acute renal insufficiency   • Hyperlipidemia   • Hypertension   • Coronary artery disease   • Essential hypertension   • Coronary artery disease involving native coronary artery of native heart without angina pectoris   • NSTEMI (non-ST elevated myocardial infarction) (CMS/Formerly [facility])   • Coronary artery disease involving native coronary artery of native heart with unstable angina pectoris (CMS/Formerly [facility])         Physical Exam:    General: Somnolent but alert and appropriate on ventilator  Head:  Normocephalic, atraumatic, mucous membranes moist.  Endotracheal tube  Eyes:  Conjunctiva/corneas clear, EOM's intact     Neck:  Supple, lines  Lungs: Coarse and mechanical  Chest wall: Bandages clean and intact  Heart::  Regular rate and rhythm, S1 and S2 normal, 1/6 holosystolic murmur.  No rub or gallop  Abdomen: Soft, non-tender, nondistended bowel sounds active.  Morbidly obese  Extremities: No cyanosis, clubbing, or edema.  2+ edema  Pulses: Diminished pedal pulses  Skin:  No rashes or lesions  Neuro/psych: Sedated and somnolent but arousable and appropriate on ventilator  Reviewed physical exam    Vital Signs:  Vitals:    01/01/21 1000 01/01/21 1011 01/01/21 1100 01/01/21 1200   BP:       Pulse: 94 97 97 93   Resp:       Temp:       TempSrc:       SpO2: 99% 99% 98% 98%   Weight:       Height:         Wt Readings from Last 1 Encounters:   01/01/21 (!) 151 kg (333 lb 8.9 oz)       Intake/Output Summary (Last 24 hours) at 1/1/2021 1215  Last data filed at 1/1/2021 1000  Gross per 24 hour   Intake 3996 ml   Output 1980  ml   Net 2016 ml         Results Review:     CBC    Results from last 7 days   Lab Units 01/01/21  0333 12/31/20  0832 12/31/20  0357 12/30/20  0746 12/30/20  0445 12/30/20  0408 12/30/20  0403  12/30/20  0106  12/29/20  2352  12/29/20  0507 12/28/20 2031   WBC 10*3/mm3 14.50*  --  16.10*  --   --   --  22.20*  --  21.10*  --  21.20*  --  12.60* 14.10*   HEMOGLOBIN g/dL 8.5*  --  9.7*  --   --   --  10.4*  --  10.6*  --  9.2*  --  13.8 14.3   HEMOGLOBIN, POC g/dL  --  8.9*  --  9.9* 10.2* 10.3*  --    < >  --    < >  --    < >  --   --    PLATELETS 10*3/mm3 131*  --  191  --   --   --  239  --  261  --  238  238  --  226 224    < > = values in this interval not displayed.     Cr Clearance Estimated Creatinine Clearance: 65.7 mL/min (A) (by C-G formula based on SCr of 1.43 mg/dL (H)).  Coag   Results from last 7 days   Lab Units 12/31/20  0357 12/30/20  0106 12/29/20  2352 12/29/20  1639 12/29/20  0507 12/28/20 2031 12/28/20  1210   INR  1.19* 1.18* 1.17* 1.05  --  1.02 1.04   APTT seconds  --  27.4 25.2 28.7* 36.5* 41.5* 26.4     HbA1C   Lab Results   Component Value Date    HGBA1C 5.7 (H) 12/29/2020     Blood Glucose   Glucose   Date/Time Value Ref Range Status   01/01/2021 1028 124 (H) 70 - 105 mg/dL Final     Comment:     Serial Number: [id number]:  [id number]   01/01/2021 0828 117 (H) 74 - 100 mg/dL Final     Comment:     Serial Number: [id number]:  [id number]   01/01/2021 0628 122 (H) 70 - 105 mg/dL Final     Comment:     Serial Number: [id number]:  [id number]   01/01/2021 0302 118 (H) 70 - 105 mg/dL Final     Comment:     Serial Number: [id number]:  [id number]   01/01/2021 0202 116 (H) 70 - 105 mg/dL Final     Comment:     Serial Number: [id number]:  [id number]   01/01/2021 0058 123 (H) 70 - 105 mg/dL Final     Comment:     Serial Number: [id number]:  [id number]   01/01/2021 0012 120 (H) 70 - 105 mg/dL Final     Comment:     Serial Number: [id number]:  [id number]    12/31/2020 2152 119 (H) 70 - 105 mg/dL Final     Comment:     Serial Number: [id number]:  [id number]     Infection   Results from last 7 days   Lab Units 12/29/20  1744 12/28/20  1210   URINECX  >100,000 CFU/mL Klebsiella pneumoniae ssp pneumoniae*  --    PROCALCITONIN ng/mL  --  0.07     CMP   Results from last 7 days   Lab Units 01/01/21  0333 12/31/20  0357 12/30/20  1602 12/30/20  1126 12/30/20  0403 12/30/20  0106 12/29/20  1639   SODIUM mmol/L 145 144 143 142 142 139 137   POTASSIUM mmol/L 3.5 3.8 4.0 3.9  3.8 3.4* 3.9 3.8   CHLORIDE mmol/L 115* 111* 109* 106 105 105 103   CO2 mmol/L 21.0* 21.0* 23.0 20.0* 21.0* 19.0* 20.0*   BUN mg/dL 36* 41* 45* 46* 48* 49* 53*   CREATININE mg/dL 1.43* 1.69* 1.90* 1.89* 1.72* 1.62* 1.41*   GLUCOSE mg/dL 113* 140* 163* 173* 182* 194* 141*   ALBUMIN g/dL  --  3.70  --   --  4.10 3.80 3.70   BILIRUBIN mg/dL  --   --   --   --   --   --  0.7   ALK PHOS U/L  --   --   --   --   --   --  49   AST (SGOT) U/L  --   --   --   --   --   --  25   ALT (SGPT) U/L  --   --   --   --   --   --  21     ABG    Results from last 7 days   Lab Units 01/01/21  0828 01/01/21  0319 12/31/20  0832 12/31/20  0300 12/30/20  1048 12/30/20  0746 12/30/20  0445 12/30/20  0408   PH, ARTERIAL pH units  --  7.435 7.440 7.449 7.367 7.377 7.367 7.350   PCO2, ARTERIAL mm Hg  --  32.8* 31.7* 32.7* 34.3* 37.2 37.0 38.3   PO2 ART mm Hg  --  102.1 96.2 107.7 91.8 95.3 74.7* 90.0   O2 SATURATION ART %  --  98.2* 97.8 98.5* 96.9 97.3 94.4 96.5   BASE EXCESS ART mmol/L -2.0* -1.8* -2.2* -0.9* -5.1* -3.0* -3.6* -4.1*     UA    Results from last 7 days   Lab Units 12/29/20  1744   NITRITE UA  Negative   WBC UA /HPF 13-20*   BACTERIA UA /HPF Trace*   SQUAM EPITHEL UA /HPF 0-2   URINECX  >100,000 CFU/mL Klebsiella pneumoniae ssp pneumoniae*     INDRA  No results found for: POCMETH, POCAMPHET, POCBARBITUR, POCBENZO, POCCOCAINE, POCOPIATES, POCOXYCODO, POCPHENCYC, POCPROPOXY, POCTHC, POCTRICYC  Lysis Labs   Results  from last 7 days   Lab Units 01/01/21  0333 12/31/20  0832 12/31/20  0357 12/30/20  1602 12/30/20  1126 12/30/20  0746 12/30/20  0445 12/30/20  0408 12/30/20  0403  12/30/20  0106  12/29/20  2352  12/29/20  1639 12/29/20  0507 12/28/20  2031 12/28/20  1210   INR   --   --  1.19*  --   --   --   --   --   --   --  1.18*  --  1.17*  --  1.05  --  1.02 1.04   APTT seconds  --   --   --   --   --   --   --   --   --   --  27.4  --  25.2  --  28.7* 36.5* 41.5* 26.4   FIBRINOGEN mg/dL  --   --   --   --   --   --   --   --   --   --  423  --  423  --   --   --   --   --    HEMOGLOBIN g/dL 8.5*  --  9.7*  --   --   --   --   --  10.4*  --  10.6*  --  9.2*  --   --  13.8 14.3 13.1   HEMOGLOBIN, POC g/dL  --  8.9*  --   --   --  9.9* 10.2* 10.3*  --    < >  --    < >  --    < >  --   --   --   --    PLATELETS 10*3/mm3 131*  --  191  --   --   --   --   --  239  --  261  --  238  238  --   --  226 224 207   CREATININE mg/dL 1.43*  --  1.69* 1.90* 1.89*  --   --   --  1.72*  --  1.62*  --   --   --  1.41* 1.60*  --  1.84*    < > = values in this interval not displayed.     Radiology(recent) Xr Chest 1 View    Result Date: 1/1/2021  No interval change from yesterday's study with abnormalities as described.  Electronically Signed By-Gary Meza MD On:1/1/2021 9:42 AM This report was finalized on [id number] by  Gary Meza MD.    Xr Chest 1 View    Result Date: 12/31/2020   1. Support tubes and line, as above. 2. Stable patchy left basilar opacity with blunting of the left costophrenic angle, likely reflecting a small pleural effusion and underlying atelectasis, postoperative change, and/or pneumonia. 3. Stable medial opacities in the right lung, which could represent atelectasis and/or postoperative change. 4. Stable widening of the cardiac and mediastinal contours, which could reflect postoperative changes, likely accentuated by technique.  Electronically Signed By-Alvino Dunbar MD On:12/31/2020 7:50 AM This report was  finalized on [id number] by  Alvino Dunbar MD.        Results from last 7 days   Lab Units 12/29/20  0751   TROPONIN T ng/mL 1.530*       Imaging Results (Last 24 Hours)     Procedure Component Value Units Date/Time    XR Chest 1 View [[id number]] Collected: 01/01/21 0940     Updated: 01/01/21 0945    Narrative:      DATE OF EXAM:  1/1/2021 4:31 AM     PROCEDURE:  XR CHEST 1 VW-     INDICATIONS:  Myocardial infarction, status post cardiac surgery, chest pain, coronary  artery disease.      COMPARISON:  12/31/2020.     TECHNIQUE:   Single radiographic view of the chest was obtained.     FINDINGS:  There are bilateral chest tubes as well as a mediastinal chest tube in  place. There is no pneumothorax. The [place]-Amada catheter and endotracheal  tube remain stable. The nasogastric tube tip projects out of the  field-of-view, but below the hemidiaphragms. The heart is enlarged.  There is abnormal right perihilar consolidation and patchy consolidation  throughout the left periareolar region and left lung base probably due  to atelectasis although I cannot exclude patchy airspace disease. The  right pleural space is clear. The patient has a small left pleural  effusion. There is no interval change from yesterday's study.          Impression:      No interval change from yesterday's study with abnormalities as  described.     Electronically Signed By-Gary Meza MD On:1/1/2021 9:42 AM  This report was finalized on [id number] by  Gary Meza MD.          Cardiac Studies:  Echo-   Results for orders placed during the hospital encounter of 12/28/20   Adult Transthoracic Echo Complete W/ Cont if Necessary Per Protocol    Narrative · Estimated left ventricular EF was in agreement with the calculated left   ventricular EF. Left ventricular ejection fraction appears to be 36 - 40%.  · The following left ventricular wall segments are akinetic: apical   anterior, apical lateral, apical inferior, apical septal and apex.         Stress Myoview-  Cath-        Montrell Stewart MD  01/01/21  12:15 EST

## 2021-01-01 NOTE — PROGRESS NOTES
PULMONARY/CRITICAL CARE PROGRESS NOTE           Name:  Paxton Wilkerson  Age: 75 y.o.  Sex:  male  :   1945  MRN:  [id number]     ROOM:  [facility] /     ASSESSMENT & PLAN     F/U: Acute respiratory failure with hypoxia    Principal Problem:    Non-ST elevation myocardial infarction (NSTEMI) (CMS/[facility])  Active Problems:    Chest pain    Acute exacerbation of CHF (congestive heart failure) (CMS/[facility])    Acute renal insufficiency    Hyperlipidemia    Hypertension    Coronary artery disease    Essential hypertension    Coronary artery disease involving native coronary artery of native heart without angina pectoris    NSTEMI (non-ST elevated myocardial infarction) (CMS/[facility])    Coronary artery disease involving native coronary artery of native heart with unstable angina pectoris (CMS/[facility])       Multidisciplinary Rounds:  -Fevers overnight, T-Max 101.8; continued fevers, checking venous duplexes  -ID Consulted, added Unasyn, 1 dose of Vancomycin given.  -A Fib, rebolused x 3 times overnight, on 1 mg/min per CTS; cardiology following.  -BLE Venous Duplex  -Will keep intubated, still on multiple vasopressors, recent post-op emergent CABG, continues with IABP; weaning to 1:2, possibly weaning, possible removal tomorrow.  -Patient is high risk for decline if extubated due to needing to lie flat.  -Wean FiO2 as tolerated, maintain oxygen saturation > 91%.  -Drips: Amio/Levo/Epi/Vaso; still weaning.  -Worsening edema, would benefit from diuretics, however, BP is low on multiple pressors.  -Sedation with Precedex, will remain intubated.    PLAN:  NSTEMI/CAD, H/O Cardiac Stents  -Cardiology previously consulted  -Patient did have serial troponins monitored  -On aspirin, Plavix per Cards  -Previously on heparin drip  -Underwent cardiac catheterization on  with findings of: Severe multivessel coronary artery disease involving LAD, circumflex, RCA, diagonal, and right ventricular branches.  -IABP placed due to LV  dysfunction.  -CTS consulted, work up in progress, but patient developed a second VT episode with loss of pulse, in which CPR was initiated with ROSC.  -Underwent emergent CABG x 6/MVr on 12/29 by Dr. Calderon  -Post-operative management per CTS  -CT Management per CTS.     Ventricular tachycardia  -Did have loss of responsiveness, code 4 initiated, spontaneously resolved  -Did have subsequent hypotension that spontaneously resolved, received 500 mL IV fluid bolus  -High risk for further decompensation  -Likely ischemia related, EKG with multiple areas of ischemia but without any ST elevations  -Cardiology aware and following, plan for cardiac catheterization once respiratorily stable     Acute exacerbation of CHF  -Echocardiogram-pending  -Elevated BNP on admission  -Chest x-ray with enlarged cardiac silhouette  -CHF work-up per cardiology  -Alvarado catheter placed with dose of Lasix, with good urine output; further diuresis as BP and renal function allows     Acute respiratory failure with hypoxia  -Multifactorial, likely secondary to cardiac ischemia, complicated by volume overload/CHF  -Patient required titration to nonrebreather with continued fluctuations in oxygen saturation, was placed on AVAPS  -Placed on AVAPs, tolerating well, discussed with theodore Malhotra to proceed with Cath on AVAPs.  -Alvarado catheter placed and Lasix given IVP per cardiology, currently with UOP.  -Wean oxygen supplementation/FiO2 as tolerated, titrate for oxygen saturation greater than 91%   -Chest x-ray reviewed  -High risk for intubation, discussed with patient     Hyperlipidemia  -Continue statin therapy     Essential hypertension, chronic  -Currently with borderline blood pressure, antihypertensives currently on hold  -Cardiology managing     CKD, stage 3  -Nephrology consulted  -Monitor and trend labs  -Avoid nephrotoxic medications, NSAIDs, and hypotension  -Patient started on p.o. Mucomyst per nephrology     Gout  -On  Allopurinol     Morbid obesity  -Complicating all aspects of care  -BMI 42.64  -Counseled on lifestyle modifications to improve overall health    Accuchecks with insulin coverage as ordered.   Code Status: CPR, full interventions  VTE Prophylaxis: SCDs, Lovenox when okay with cardiology/CTS  PUD Prophylaxis: Protonix      [place] & SUBJECTIVE     Paxton Wilkerson is a 75 y.o. male  has a past medical history of Angina pectoris (CMS/HCC), Arthritis, Coronary artery disease, Fatigue, Gout, Hyperlipidemia, Hypertension, and Obesity.  Patient presented to [facility] ED on 12/28/2020 with complaint of anterior chest pain over the prior week.  Patient stated that he has become short of breath with minimal exertion with the development of chest pain.  Patient described the pain as an achy type of pain that does not radiate.  When he rests, the pain and dyspnea have resolved.  He also admitted to the worsening bilateral lower extremity swelling over the preceding 24 hours prior to his arrival, in which he does report that he has adequate urination.  Patient denied any prior history of congestive heart failure, but has stated that he has had some low blood pressures at home with his systolic blood pressure in the 90s.  Patient denied cough, fever, known contact with ill individuals including individuals with COVID-19, and reports that he has been socially isolating at home.  While in the ED, patient had a chest x-ray that revealed an enlarged cardiac silhouette with airspace opacities in both mid to lower lungs, which could be related to pneumonia, atelectasis, and/or pulmonary edema.  Patient had labs obtained which revealed a proBNP of 4896, troponin I 0.740, creatinine 1.84.  EKG was obtained that revealed sinus tachycardia with a rate of 102, PVCs with a left bundle branch block.  Patient was tested for COVID-19 and negative.  Patient was ordered a nitroglycerin drip initially, but was not started on it due to a  borderline blood pressure.  Cardiology was consulted, and patient was admitted for a non-STEMI as well as congestive heart failure.  Patient was initially admitted to the hospitalist service.     Patient was evaluated by cardiology, with planned cardiac catheterization this afternoon, but this morning, patient had a fast call initiated that changed to a code 4, as patient had developed an episode of ventricular tachycardia with a loss of pulse unresponsiveness, that did resolve spontaneously.  Patient reported that he had no knowledge of his loss of responsiveness.  Upon resolve of the ventricular tachycardia, patient did have a low blood pressure that did recover after a 500 mL IV fluid bolus.  Patient did require titration up on his oxygen supplementation, in which he was placed on a nonrebreather.  Nephrology was also consulted due to his SILKE and planned cardiac catheterization.  Pulmonary consultation was requested for further evaluation and treatment of patient condition.  Pulmonary was consulted, as patient would not tolerate lying flat on a nonrebreather, and it was requested by cardiology for patient to be evaluated for possible need for intubation to complete cardiac catheterization.  Following patient's ventricular tachycardia episode, patient did have an EKG obtained that did have some evidence of multi lead ischemic changes without any evidence of ST elevation.  Upon evaluation by pulmonary, patient was attempted on AVAPS, and patient was changed to a flat position, in which patient's respirations remained stable, with reported improvement in his shortness of breath, patient reported that he felt that he could tolerate this machine.  It was informed to the patient that he was high risk for requiring intubation due to his fluid volume status and the issues that it is causing upon his respiratory status.  Patient did express understanding of this.  This was discussed with patient and his wife at bedside.   Additionally, it was discussed with cardiologist, who agreed to take patient for cardiac catheterization on AVAPS.  Patient currently denies chest pain, still has some mild shortness of breath, but denies cough, congestion, abdominal pain, nausea, vomiting, diarrhea, constipation, dysuria, blood in urine or stool.  Patient prior to his cardiac catheterization did have a Alvarado catheter placed and was given 1 dose of IV Lasix with visible increased urine output in Alvarado catheter bag.     Thank you for this consultation, we will follow along on this patient.      12/30: Intubated, sedated, nodding appropriately, following commands  1/1: Intubated, sedated, nodding appropriately, following commands, NGT to LIWS      MEDICATIONS     SCHEDULED MEDICATIONS:   aspirin, 81 mg, Oral, Daily  ceFAZolin, 3 g, Intravenous, Q12H  chlorhexidine, 15 mL, Mouth/Throat, Q12H  enoxaparin, 40 mg, Subcutaneous, Q12H  famotidine, 20 mg, Intravenous, Q12H  insulin lispro, 0-14 Units, Subcutaneous, Q6H  mupirocin, , Each Nare, BID  polyethylene glycol, 17 g, Oral, BID  rosuvastatin, 20 mg, Oral, Daily  senna-docusate sodium, 2 tablet, Oral, BID  sodium chloride, 10 mL, Intravenous, Q12H        CONTINUOUS INFUSIONS:   amiodarone, 1 mg/min, Last Rate: 1 mg/min (01/01/21 0128)  dexmedetomidine, 0.2-1.5 mcg/kg/hr, Last Rate: 1.5 mcg/kg/hr (01/01/21 0632)  EPINEPHrine, 0.02-0.3 mcg/kg/min, Last Rate: 0.06 mcg/kg/min (01/01/21 0509)  insulin, 0-50 Units/hr, Last Rate: 4.2 Units/hr (01/01/21 0638)  milrinone, 0.25-0.75 mcg/kg/min, Last Rate: 0.25 mcg/kg/min (01/01/21 0602)  niCARdipine, 5-15 mg/hr  norepinephrine, 0.02-0.3 mcg/kg/min, Last Rate: 0.04 mcg/kg/min (01/01/21 0509)  phenylephrine, 0.5-3 mcg/kg/min, Last Rate: 0.5 mcg/kg/min (12/29/20 4444)  sodium chloride, 30 mL/hr  vasopressin, 0.02-0.1 Units/min, Last Rate: 0.03 Units/min (12/31/20 8744)        PRN MEDS:  •  acetaminophen **OR** acetaminophen **OR** acetaminophen  •  acetaminophen  •  " ALPRAZolam  •  aluminum-magnesium hydroxide-simethicone  •  bisacodyl  •  bisacodyl  •  Chlorhexidine Gluconate Cloth  •  cyclobenzaprine  •  dextrose  •  dextrose  •  EPINEPHrine  •  glucagon (human recombinant)  •  HYDROcodone-acetaminophen  •  insulin lispro **AND** insulin lispro  •  insulin  •  ipratropium-albuterol  •  LORazepam  •  magnesium hydroxide  •  magnesium sulfate **OR** magnesium sulfate **OR** magnesium sulfate  •  melatonin  •  milrinone  •  Morphine **AND** naloxone  •  niCARdipine  •  norepinephrine  •  ondansetron **OR** ondansetron  •  oxyCODONE  •  potassium chloride **OR** potassium chloride  •  [COMPLETED] Insert peripheral IV **AND** sodium chloride  •  sodium chloride  •  sodium chloride  •  sodium chloride  •  sodium chloride  •  vasopressin    OBJECTIVE     VITAL SIGNS:  /55   Pulse 91   Temp 100.4 °F (38 °C)   Resp 17   Ht 172.7 cm (68\")   Wt (!) 151 kg (333 lb 8.9 oz)   SpO2 100%   BMI 50.72 kg/m²     I/O FROM PREVIOUS 3 SHIFTS:  I/O last 3 completed shifts:  In: 6569 [I.V.:6319; IV Piggyback:250]  Out: 3741 [Urine:2905; Chest Tube:836]    I/O THIS SHIFT:  No intake/output data recorded.    BOWEL MOVEMENTS:          PHYSICAL EXAM:  General Appearance:  Sedated, easily aroused, intubated, cooperative, no distress, appears stated age  Head:  Normocephalic, without obvious abnormality, atraumatic  Eyes:  PERRL, conjunctiva/corneas clear, EOM's intact, scleral edema noted   Neck:  Supple,  no adenopathy, trachea midline, OETT in place  Lungs:   Bibasilar diminished breath sounds, bibasilar crackles, without rhonchi or wheezes, respirations unlabored without accessory muscle use  Chest wall:  No tenderness, post-operative dressing C/D/I; CT in place.  Heart:  Regular rate and rhythm, S1 and S2 normal, no murmur, rub or gallop  Abdomen:  Obese, soft, non-tender, bowel sounds active all four quadrants, no masses, no hepatomegaly, no splenomegaly  Extremities:  BLE edema 3+ and " pitting, no cyanosis; right femoral IABP in place without hematoma; right radial arterial line  Pulses: 2+ and symmetric all extremities  Skin:  No rashes or lesions, post-operative dressings C/D/I  Neurologic:   Drowsy/lightly sedated, easy to arouse, following commands, moves all extremities      RESULTS     LABS:  Lab Results (last 24 hours)     Procedure Component Value Units Date/Time    Respiratory Culture - Sputum, ET Suction [[id number]] Collected: 12/31/20 2142    Specimen: Sputum from ET Suction Updated: 01/01/21 0655     Gram Stain Few (2+) WBCs per low power field      Rare (1+) Gram positive cocci      Occasional Gram negative bacilli    POC Glucose Once [[id number]]  (Abnormal) Collected: 01/01/21 0628    Specimen: Blood Updated: 01/01/21 0630     Glucose 122 mg/dL      Comment: Serial Number: [id number]:  [id number]       Basic Metabolic Panel [[id number]]  (Abnormal) Collected: 01/01/21 0333    Specimen: Blood Updated: 01/01/21 0400     Glucose 113 mg/dL      BUN 36 mg/dL      Creatinine 1.43 mg/dL      Sodium 145 mmol/L      Potassium 3.5 mmol/L      Chloride 115 mmol/L      CO2 21.0 mmol/L      Calcium 8.3 mg/dL      eGFR Non African Amer 48 mL/min/1.73      BUN/Creatinine Ratio 25.2     Anion Gap 9.0 mmol/L     Narrative:      GFR Normal >60  Chronic Kidney Disease <60  Kidney Failure <15      Calcium, Ionized [[id number]]  (Abnormal) Collected: 01/01/21 0333    Specimen: Blood Updated: 01/01/21 0358     Ionized Calcium 1.18 mmol/L     CBC (No Diff) [[id number]]  (Abnormal) Collected: 01/01/21 0333    Specimen: Blood Updated: 01/01/21 0339     WBC 14.50 10*3/mm3      RBC 2.93 10*6/mm3      Hemoglobin 8.5 g/dL      Hematocrit 25.6 %      MCV 87.5 fL      MCH 28.9 pg      MCHC 33.0 g/dL      RDW 15.2 %      RDW-SD 46.4 fl      MPV 7.3 fL      Platelets 131 10*3/mm3     Blood Gas, Arterial - [[id number]]  (Abnormal) Collected: 01/01/21 0319    Specimen: Arterial Blood Updated: 01/01/21 0322     Site  Arterial Line     Lee's Test N/A     pH, Arterial 7.435 pH units      pCO2, Arterial 32.8 mm Hg      pO2, Arterial 102.1 mm Hg      HCO3, Arterial 22.1 mmol/L      Base Excess, Arterial -1.8 mmol/L      Comment: Serial Number: [id number]:  [id number]        O2 Saturation, Arterial 98.2 %      CO2 Content 23.1 mmol/L      Barometric Pressure for Blood Gas --     Comment: N/A        Modality Adult Vent     FIO2 40 %      Ventilator Mode ;AC     Set Tidal Volume 600     PEEP 5     Hemodilution No     Respiratory Rate 16    POC Glucose Once [[id number]]  (Abnormal) Collected: 01/01/21 0302    Specimen: Blood Updated: 01/01/21 0303     Glucose 118 mg/dL      Comment: Serial Number: [id number]:  [id number]       POC Glucose Once [[id number]]  (Abnormal) Collected: 01/01/21 0202    Specimen: Blood Updated: 01/01/21 0204     Glucose 116 mg/dL      Comment: Serial Number: [id number]:  [id number]       POC Glucose Once [[id number]]  (Abnormal) Collected: 01/01/21 0058    Specimen: Blood Updated: 01/01/21 0059     Glucose 123 mg/dL      Comment: Serial Number: [id number]:  [id number]       POC Glucose Once [[id number]]  (Abnormal) Collected: 01/01/21 0012    Specimen: Blood Updated: 01/01/21 0057     Glucose 120 mg/dL      Comment: Serial Number: [id number]:  [id number]       POC Glucose Once [[id number]]  (Abnormal) Collected: 12/31/20 2152    Specimen: Blood Updated: 12/31/20 2154     Glucose 119 mg/dL      Comment: Serial Number: [id number]:  [id number]       POC Glucose Once [[id number]]  (Abnormal) Collected: 12/31/20 2000    Specimen: Blood Updated: 12/31/20 2006     Glucose 115 mg/dL      Comment: Serial Number: [id number]:  [id number]       POC Glucose Once [[id number]]  (Abnormal) Collected: 12/31/20 1727    Specimen: Blood Updated: 12/31/20 1728     Glucose 116 mg/dL      Comment: Serial Number: [id number]:  [id number]       POC Glucose Once [[id number]]  (Abnormal) Collected:  12/31/20 1412    Specimen: Blood Updated: 12/31/20 1416     Glucose 116 mg/dL      Comment: Serial Number: [id number]:  [id number]       POC Glucose Once [[id number]]  (Abnormal) Collected: 12/31/20 1215    Specimen: Blood Updated: 12/31/20 1219     Glucose 122 mg/dL      Comment: Serial Number: [id number]:  [id number]       POC Glucose Once [[id number]]  (Abnormal) Collected: 12/31/20 1017    Specimen: Blood Updated: 12/31/20 1018     Glucose 125 mg/dL      Comment: Serial Number: [id number]:  [id number]       Urine Culture - Urine, Urine, Random Void [[id number]]  (Abnormal)  (Susceptibility) Collected: 12/29/20 1744    Specimen: Urine, Random Void Updated: 12/31/20 0929     Urine Culture >100,000 CFU/mL Klebsiella pneumoniae ssp pneumoniae    Susceptibility      Klebsiella pneumoniae ssp pneumoniae     NINA     Ampicillin Resistant     Ampicillin + Sulbactam Susceptible     Cefazolin Susceptible     Cefepime Susceptible     Ceftazidime Susceptible     Ceftriaxone Susceptible     Gentamicin Susceptible     Levofloxacin Susceptible     Nitrofurantoin Intermediate     Piperacillin + Tazobactam Susceptible     Tetracycline Susceptible     Trimethoprim + Sulfamethoxazole Susceptible                    POCT Electrolytes +HGB +HCT [[id number]]  (Abnormal) Collected: 12/31/20 0832    Specimen: Blood Updated: 12/31/20 0835     Sodium 145 mmol/L      POC Potassium 3.4 mmol/L      Ionized Calcium 1.18 mmol/L      Comment: Serial Number: [id number]:  [id number]        Glucose 129 mg/dL      Hematocrit 26 %      Hemoglobin 8.9 g/dL     POC Lactate [[id number]]  (Normal) Collected: 12/31/20 0832    Specimen: Blood Updated: 12/31/20 0835     Lactate 1.4 mmol/L      Comment: Serial Number: [id number]:  [id number]       POC Glucose Once [[id number]]  (Abnormal) Collected: 12/31/20 0832    Specimen: Blood Updated: 12/31/20 0835     Glucose 129 mg/dL      Comment: Serial Number: [id number]:  [id number]       Blood Gas,  Arterial - [[id number]]  (Abnormal) Collected: 12/31/20 0832    Specimen: Arterial Blood Updated: 12/31/20 0835     Site Arterial Line     Lee's Test N/A     pH, Arterial 7.440 pH units      pCO2, Arterial 31.7 mm Hg      pO2, Arterial 96.2 mm Hg      HCO3, Arterial 21.5 mmol/L      Base Excess, Arterial -2.2 mmol/L      Comment: Serial Number: [id number]:  [id number]        O2 Saturation, Arterial 97.8 %      CO2 Content 22.5 mmol/L      Barometric Pressure for Blood Gas --     Comment: N/A        Modality Adult Vent     FIO2 40 %      Ventilator Mode ;AC     Set Tidal Volume 600     PEEP 5     Hemodilution No     Respiratory Rate 16           RADIOLOGY:  No Radiology Exams Resulted Within Past 24 Hours    ECHOCARDIOGRAM:  Results for orders placed during the hospital encounter of 12/28/20   Adult Transthoracic Echo Complete W/ Cont if Necessary Per Protocol    Narrative · Estimated left ventricular EF was in agreement with the calculated left   ventricular EF. Left ventricular ejection fraction appears to be 36 - 40%.  · The following left ventricular wall segments are akinetic: apical   anterior, apical lateral, apical inferior, apical septal and apex.           I reviewed the patient's new clinical results.    This note has been scribed by me for pulmonary attending physician.    Electronically signed by MAYRA Castillo, 01/01/21 at 08:16 EST.

## 2021-01-01 NOTE — CONSULTS
Infectious Diseases Consult Note    Referring Provider: Jr Keyur Calderon MD    Reason for Consultation: Fever    Patient Care Team:  Jane Shearer as PCP - General (Internal Medicine)    Chief complaint intubated and sedated    Subjective     The patient has had fevers as high as 101.8 degrees in the 24 hours.  The patient is debated at 40% FiO2 and 5 of PEEP.  He is currently on a balloon pump.  He was on amiodarone, Primacor, insulin, Levophed, epinephrine, vasopressin, Precedex drips.  Discussed case with RN at bedside.    History of present illness:      This is a 75-year-old male who presents the hospital on 12/28/2020 complaints of shortness of breath and chest pain.  He was ruled in for an NSTEMI and a cardiac catheterization saw multivessel disease.  Patient had a code on 12/29/2020 due to V. tach and loss of consciousness.  On 12/29/2020 the patient had a CABG x6 vessels and a mitral valve repair.  Patient started having fevers on 12/31/2020 and continues to have high-grade fevers in today.  He remains intubated and on a balloon pump.    Patient is intubated at 40% FiO2 and remains on multiple drips.  Patient had a fever as high as 101.8 degrees since admission.  Patient has a creatinine 1.43, white blood cell count 14.5, hemoglobin 8.5, platelets of 131.  Patient's Covid screen was negative, urine grew Klebsiella, sputum cultures pending.  The latest chest x-ray shows some left basilar opacities.  Patient apparently came in with some severe lower leg swelling with some mild cellulitic changes and his urine was positive for Klebsiella pneumonia.  Patient was treated with IV cefazolin for 3 days and was switched to IV Unasyn today and had 1 dose of IV vancomycin.  He has no known allergies.    Patient has a past medical history significant for hypertension, hyperlipidemia, gout, CAD, arthritis, angina pectoris, left total hip replacement, and multiple cardiac catheterizations and stent placements  along with an appendectomy.  Patient is a former smoker and a former smokeless tobacco user and denies any alcohol or illicit drug abuse      Review of Systems   Review of Systems   Unable to perform ROS: Intubated       Medications  Medications Prior to Admission   Medication Sig Dispense Refill Last Dose   • carvedilol (COREG) 6.25 MG tablet Take 6.25 mg by mouth 3 (Three) Times a Day With Meals.   Patient Taking Differently at Unknown time   • carvedilol (COREG) 6.25 MG tablet Take 6.25 mg by mouth Every Evening.      • irbesartan (AVAPRO) 300 MG tablet Take 300 mg by mouth Every Night.      • rosuvastatin (CRESTOR) 20 MG tablet Take 1 tablet by mouth Daily. (Patient taking differently: Take 20 mg by mouth Daily. Takesatnight) 90 tablet 4 Patient Taking Differently at Unknown time   • allopurinol (ZYLOPRIM) 100 MG tablet 2 (Two) Times a Day.      • aspirin 81 MG tablet Take 81 mg by mouth Daily.      • clopidogrel (PLAVIX) 75 MG tablet Take 1 tablet by mouth Daily. 90 tablet 4    • MULTIPLE VITAMIN PO Take 1 tablet by mouth Daily.      • nitroglycerin (NITROSTAT) 0.4 MG SL tablet Place 1 tablet under the tongue Every 5 (Five) Minutes As Needed for Chest Pain. Take no more than 3 doses in 15 minutes. 25 tablet 4    • triamterene-hydrochlorothiazide (DYAZIDE) 37.5-25 MG per capsule Take 1 capsule by mouth Daily. 90 capsule 4        History  Past Medical History:   Diagnosis Date   • Angina pectoris (CMS/HCC)    • Arthritis    • Coronary artery disease    • Fatigue    • Gout    • Hyperlipidemia    • Hypertension    • Obesity      Past Surgical History:   Procedure Laterality Date   • APPENDECTOMY     • CARDIAC CATHETERIZATION     • CARDIAC CATHETERIZATION N/A 12/29/2020    Procedure: LEFT HEART CATH with possible PCI-radial approach;  Surgeon: Jose Alfredo Neff DO;  Location: [facility] CATH INVASIVE LOCATION;  Service: Cardiology;  Laterality: N/A;  Local and IV sedation   • CORONARY ANGIOPLASTY  1990's   •  CORONARY STENT PLACEMENT  09/22/2004   • TOTAL HIP ARTHROPLASTY Left 04/07/2014       Family History  Family History   Problem Relation Age of Onset   • Stroke Mother    • Hypertension Mother    • Heart attack Father        Social History   reports that he has quit smoking. His smoking use included cigarettes. He smoked 1.00 pack per day. He has quit using smokeless tobacco.  His smokeless tobacco use included chew. He reports that he does not drink alcohol or use drugs.    Allergies  Patient has no known allergies.    Objective     Vital Signs   Vital Signs (last 24 hours)       12/31 0700  -  01/01 0659 01/01 0700  -  01/01 1527   Most Recent    Temp (°F) 100.4 -  101.8    99 -  99.5     99 (37.2)    Heart Rate 78 -  95    91 -  98     98    Resp   17       17    SpO2 (%) 92 -  100    98 -  100     99          Physical Exam:  Physical Exam  Vitals signs and nursing note reviewed.   Constitutional:       General: He is not in acute distress.     Appearance: He is well-developed. He is obese. He is ill-appearing. He is not diaphoretic.   HENT:      Head: Normocephalic and atraumatic.      Mouth/Throat:      Mouth: Mucous membranes are dry.   Eyes:      Extraocular Movements: Extraocular movements intact.      Conjunctiva/sclera: Conjunctivae normal.      Pupils: Pupils are equal, round, and reactive to light.   Neck:      Musculoskeletal: Neck supple.   Cardiovascular:      Rate and Rhythm: Normal rate and regular rhythm.      Heart sounds: Normal heart sounds, S1 normal and S2 normal.      Comments: Balloon pump in place  Pulmonary:      Effort: Pulmonary effort is normal. No respiratory distress.      Breath sounds: No stridor. No wheezing or rales.      Comments: Diminished throughout    Chest tube x3    Sternal wound appears to be healing well without any obvious signs of infection  Abdominal:      General: Bowel sounds are normal. There is no distension.      Palpations: Abdomen is soft. There is no mass.       Tenderness: There is no abdominal tenderness. There is no guarding.   Genitourinary:     Comments: Alvarado catheter  Musculoskeletal:         General: No deformity.      Right lower leg: Edema present.      Left lower leg: Edema present.   Skin:     General: Skin is warm and dry.      Coloration: Skin is not pale.      Findings: No erythema or rash.      Comments: Patient does not really show any cellulitic changes to the lower legs today-there is some signs of chronic skin changes especially to the left lower leg.  Left leg incision appears to be healing well   Neurological:      Cranial Nerves: No cranial nerve deficit.      Comments: Intubated and sedated         Microbiology  Microbiology Results (last 10 days)     Procedure Component Value - Date/Time    Respiratory Culture - Sputum, ET Suction [[id number]] Collected: 12/31/20 2142    Lab Status: Preliminary result Specimen: Sputum from ET Suction Updated: 01/01/21 0655     Gram Stain Few (2+) WBCs per low power field      Rare (1+) Gram positive cocci      Occasional Gram negative bacilli    Urine Culture - Urine, Urine, Random Void [[id number]]  (Abnormal)  (Susceptibility) Collected: 12/29/20 1744    Lab Status: Final result Specimen: Urine, Random Void Updated: 12/31/20 0929     Urine Culture >100,000 CFU/mL Klebsiella pneumoniae ssp pneumoniae    Susceptibility      Klebsiella pneumoniae ssp pneumoniae     NINA     Ampicillin Resistant     Ampicillin + Sulbactam Susceptible     Cefazolin Susceptible     Cefepime Susceptible     Ceftazidime Susceptible     Ceftriaxone Susceptible     Gentamicin Susceptible     Levofloxacin Susceptible     Nitrofurantoin Intermediate     Piperacillin + Tazobactam Susceptible     Tetracycline Susceptible     Trimethoprim + Sulfamethoxazole Susceptible                    COVID-19, [facility] IN-HOUSE,NASAL Swab (NO TRANSPORT MEDIA) 2 HR TAT - Swab, Nasopharynx [[id number]]  (Normal) Collected: 12/28/20 1233    Lab Status: Final result  Specimen: Swab from Nasopharynx Updated: 12/28/20 1307     COVID19 Presumptive Negative    Narrative:      Fact sheet for providers: https://www.fda.gov/media/155396/download     Fact sheet for patients: https://www.fda.gov/media/063602/download    Test performed by PCR.  If inconsistent with clinical signs and symptoms patient should be tested with different authorized molecular test.          Laboratory  Results from last 7 days   Lab Units 01/01/21  0333   WBC 10*3/mm3 14.50*   HEMOGLOBIN g/dL 8.5*   HEMATOCRIT % 25.6*   PLATELETS 10*3/mm3 131*     Results from last 7 days   Lab Units 01/01/21  1241 01/01/21  0333   SODIUM mmol/L  --  145   POTASSIUM mmol/L 3.5 3.5   CHLORIDE mmol/L  --  115*   CO2 mmol/L  --  21.0*   BUN mg/dL  --  36*   CREATININE mg/dL  --  1.43*   GLUCOSE mg/dL  --  113*   CALCIUM mg/dL  --  8.3*     Results from last 7 days   Lab Units 01/01/21  1241 01/01/21  0333   SODIUM mmol/L  --  145   POTASSIUM mmol/L 3.5 3.5   CHLORIDE mmol/L  --  115*   CO2 mmol/L  --  21.0*   BUN mg/dL  --  36*   CREATININE mg/dL  --  1.43*   GLUCOSE mg/dL  --  113*   CALCIUM mg/dL  --  8.3*                   Radiology  Imaging Results (Last 72 Hours)     Procedure Component Value Units Date/Time    XR Chest 1 View [[id number]] Collected: 01/01/21 0940     Updated: 01/01/21 0945    Narrative:      DATE OF EXAM:  1/1/2021 4:31 AM     PROCEDURE:  XR CHEST 1 VW-     INDICATIONS:  Myocardial infarction, status post cardiac surgery, chest pain, coronary  artery disease.      COMPARISON:  12/31/2020.     TECHNIQUE:   Single radiographic view of the chest was obtained.     FINDINGS:  There are bilateral chest tubes as well as a mediastinal chest tube in  place. There is no pneumothorax. The Ward-Amada catheter and endotracheal  tube remain stable. The nasogastric tube tip projects out of the  field-of-view, but below the hemidiaphragms. The heart is enlarged.  There is abnormal right perihilar consolidation and patchy  consolidation  throughout the left periareolar region and left lung base probably due  to atelectasis although I cannot exclude patchy airspace disease. The  right pleural space is clear. The patient has a small left pleural  effusion. There is no interval change from yesterday's study.          Impression:      No interval change from yesterday's study with abnormalities as  described.     Electronically Signed By-Gary Meza MD On:1/1/2021 9:42 AM  This report was finalized on [id number] by  Gary Meza MD.    XR Chest 1 View [[id number]] Collected: 12/31/20 0745     Updated: 12/31/20 0752    Narrative:      DATE OF EXAM:  12/31/2020 4:24 AM     PROCEDURE:  XR CHEST 1 VW-     INDICATIONS:  Postop heart surgery.     COMPARISON:  Chest radiographs 12/30/2020, 12/29/2020, 12/20/2020.     TECHNIQUE:   Single radiographic AP view of the chest was obtained.     FINDINGS:  Lordotic positioning. Overlying artifacts. Stable sternotomy wires,  postoperative changes from CABG, endotracheal tube, partially imaged  enteric tube, bilateral chest tubes. Slight retraction of the right IJ  pulmonary arterial catheter, terminating near the origin of the main  pulmonary artery. Presumed aortic balloon pump terminating in the  proximal descending thoracic aorta just distal to the aortic arch.  Similar-appearing patchy left basilar opacity with blunting of the left  costophrenic angle. Stable opacities in the medial right lung. No  pneumothorax. Stable enlargement of the cardiac and mediastinal  contours, which could reflect postoperative changes, likely accentuated  by technique. No acute osseous abnormality is identified.        Impression:         1. Support tubes and line, as above.  2. Stable patchy left basilar opacity with blunting of the left  costophrenic angle, likely reflecting a small pleural effusion and  underlying atelectasis, postoperative change, and/or pneumonia.  3. Stable medial opacities in the right lung,  which could represent  atelectasis and/or postoperative change.  4. Stable widening of the cardiac and mediastinal contours, which could  reflect postoperative changes, likely accentuated by technique.     Electronically Signed By-Alvino Dunbar MD On:12/31/2020 7:50 AM  This report was finalized on [id number] by  Alvino Dunbar MD.    XR Abdomen KUB [[id number]] Collected: 12/30/20 0719     Updated: 12/30/20 0722    Narrative:      XR ABDOMEN KUB-     Date of Exam: 12/30/2020 1:16 AM     Indication: NG tube placement.     Comparison Exams: None available.     Technique: Single AP radiograph of the abdomen     FINDINGS:  An NG tube has its tip in the stomach.       Impression:      NG tube with tip in stomach.     Electronically Signed By-Tal Cardoza MD On:12/30/2020 7:20 AM  This report was finalized on [id number] by  Tal Cardoza MD.    XR Chest 1 View [[id number]] Collected: 12/29/20 2341     Updated: 12/30/20 0144    Narrative:      PORTABLE AP CHEST     DATE: 12/30/2020 1:00 AM    HISTORY: Postop check. Tube and line Placement.    COMPARISON: None    FINDINGS:    Cardiomegaly. Median sternotomy.    Vascular congestion/mild edema. Small left pleural effusion with atelectasis. No appreciable pneumothorax.    Degenerative changes of the spine.    Endotracheal tube is 3.7 cm above the olinda. Nasogastric tube extends below the diaphragm.    Mediastinal and bilateral chest tubes.    Right internal jugular Liberty-Amada catheter tip is in the pulmonary outflow tract.      Impression:      1. Mild edema. Small left pleural effusion with atelectasis.  2. Postoperative changes as described above. Life support appliances as described above.         Electronically signed by:  Francisco Guzman M.D.    12/29/2020 11:43 PM    XR Chest 1 View [[id number]] Collected: 12/29/20 1748     Updated: 12/29/20 1752    Narrative:      DATE OF EXAM:  12/29/2020 5:36 PM     PROCEDURE:  XR CHEST 1 VW-     INDICATIONS:  Balloon  pump placement, cardiopulmonary arrest, status post  cardiopulmonary resuscitation, heart disease.      COMPARISON:  12/28/2020.     TECHNIQUE:   Single radiographic view of the chest was obtained.     FINDINGS:  There has been placement of an aortic balloon pump. The catheter tip  terminates in the proximal descending thoracic aorta. The heart is  enlarged. The pulmonary vascular markings are increased consistent with  low-grade pulmonary interstitial edema. There is a defibrillator paddles  which obscures the right upper lung zone. There is no airspace disease  or pleural effusion.  There is no pneumothorax.       Impression:         1. Placement of an aortic balloon pump with the tip terminating in the  proximal aspect of the descending thoracic aorta.  2. Cardiomegaly and pulmonary interstitial edema consistent with mild  congestive heart failure.     Electronically Signed By-Gary Meza MD On:12/29/2020 5:50 PM  This report was finalized on [id number] by  Gary Meza MD.          Cardiology      Results Review:  I have reviewed all clinical data, test, lab, and imaging results.       Schedule Meds  ampicillin-sulbactam, 3 g, Intravenous, Q6H  aspirin, 81 mg, Oral, Daily  chlorhexidine, 15 mL, Mouth/Throat, Q12H  enoxaparin, 40 mg, Subcutaneous, Q12H  famotidine, 20 mg, Intravenous, Q12H  insulin lispro, 0-14 Units, Subcutaneous, Q6H  mupirocin, , Each Nare, BID  polyethylene glycol, 17 g, Oral, BID  rosuvastatin, 20 mg, Oral, Daily  senna-docusate sodium, 2 tablet, Oral, BID  sodium chloride, 10 mL, Intravenous, Q12H        Infusion Meds  amiodarone, 1 mg/min, Last Rate: 1 mg/min (01/01/21 1404)  dexmedetomidine, 0.2-1.5 mcg/kg/hr, Last Rate: 1.5 mcg/kg/hr (01/01/21 1401)  EPINEPHrine, 0.02-0.3 mcg/kg/min, Last Rate: 0.06 mcg/kg/min (01/01/21 1150)  insulin, 0-50 Units/hr, Last Rate: 4.2 Units/hr (01/01/21 1030)  milrinone, 0.25-0.75 mcg/kg/min, Last Rate: 0.25 mcg/kg/min (01/01/21 1401)  niCARdipine, 5-15  mg/hr  norepinephrine, 0.02-0.3 mcg/kg/min, Last Rate: 0.04 mcg/kg/min (01/01/21 4849)  phenylephrine, 0.5-3 mcg/kg/min, Last Rate: 0.5 mcg/kg/min (12/29/20 4925)  sodium chloride, 30 mL/hr  vasopressin, 0.02-0.1 Units/min, Last Rate: 0.03 Units/min (01/01/21 1150)        PRN Meds  •  acetaminophen **OR** acetaminophen **OR** acetaminophen  •  acetaminophen  •  ALPRAZolam  •  aluminum-magnesium hydroxide-simethicone  •  bisacodyl  •  bisacodyl  •  Chlorhexidine Gluconate Cloth  •  cyclobenzaprine  •  dextrose  •  dextrose  •  EPINEPHrine  •  glucagon (human recombinant)  •  HYDROcodone-acetaminophen  •  insulin lispro **AND** insulin lispro  •  insulin  •  ipratropium-albuterol  •  LORazepam  •  magnesium hydroxide  •  magnesium sulfate **OR** magnesium sulfate **OR** magnesium sulfate  •  melatonin  •  milrinone  •  Morphine **AND** naloxone  •  niCARdipine  •  norepinephrine  •  ondansetron **OR** ondansetron  •  oxyCODONE  •  potassium chloride **OR** potassium chloride  •  [COMPLETED] Insert peripheral IV **AND** sodium chloride  •  sodium chloride  •  sodium chloride  •  sodium chloride  •  sodium chloride  •  vasopressin      Assessment/Plan       Assessment    Fever in a postoperative CABG patient.  The fever is probably secondary to pneumonia.  Patient was noted to have significant infiltrate in the left lower lobe.  Chest x-ray prior to surgery was clear  -Need to rule out line infection, continued urinary tract infection, pneumonia, DVTs  -Patient has a right femoral line for the balloon pump, a right central line, and a left A-line    Patient had a UTI with Klebsiella pneumonia a before surgery and has had 3 days of IV cefazolin which was switched to IV Unasyn today  -Patient still has a Alvarado catheter with significant sedimentation    Reports of mild cellulitis significant swelling to the bilateral lower legs  -Currently no signs of active cellulitis in the lower legs  -Surgical incision on left leg from  CABG appears to be healing well    CABG x6 on 12/29/2019.  Patient came in with shortness of breath and chest pain and ruled in for an NSTEMI and a cardiac catheterization showed multivessel disease.  Patient also had several runs of V. tach since admission  -Still on amiodarone, Levophed, epinephrine, Primacor, vasopressin  -Still on balloon pump  -Sternal incision appears to be healing well with no obvious signs of infection    Ammonia -there is some left basilar opacities from the chest x-ray that are new since surgery  -Patient remains intubated at 40% FiO2  -Patient has remained flat due to the balloon pump but has not been receiving tube feeds    Mild leukocytosis    Acute kidney injury    History of CAD with multiple cardiac catheterization and stents    Morbid obesity    Plan    Continue vancomycin-we will ask pharmacy to pulse dose  Discontinue IV Unasyn   Start IV meropenem 1 g every 8 hours  Repeat urinalysis  Doppler of the lower extremities was already ordered-added Doppler of the upper extremities  Sputum culture is pending  We will obtain blood cultures from central line, A-line, and peripheral site  Continue supportive care  A.m. labs  Prognosis is very guarded    Flaca Ribeiro, APRN  01/01/21  15:27 EST

## 2021-01-01 NOTE — PROGRESS NOTES
"RENAL/KCC:     LOS: 3 days    Patient Care Team:  Jane Shearer PCP - General (Internal Medicine)    Chief Complaint:  SILKE/CKD    Subjective     Interval History:   Chart reviewed.  Seen in the [facility], on multiple drips/pressors with IABP.  Issues with A-fib overnight and decreased cardiac index.  BP stable.  Stable on the vent.  Non-oliguric.      Objective     Vital Sign Min/Max for last 24 hours  Temp  Min: 100.4 °F (38 °C)  Max: 101.8 °F (38.8 °C)   No data recorded   Pulse  Min: 78  Max: 95   Resp  Min: 17  Max: 17   SpO2  Min: 92 %  Max: 100 %   No data recorded   Weight  Min: 151 kg (333 lb 8.9 oz)  Max: 151 kg (333 lb 8.9 oz)     Flowsheet Rows      First Filed Value   Admission Height  177.8 cm (70\") Documented at 12/28/2020 1107   Admission Weight  (!) 148 kg (325 lb 13.4 oz) Documented at 12/28/2020 1107          No intake/output data recorded.  I/O last 3 completed shifts:  In: 6569 [I.V.:6319; IV Piggyback:250]  Out: 3741 [Urine:2905; Chest Tube:836]    Physical Exam:  GEN: Sedated, intubated  ENT: +ETT  NECK: Supple, no JVD  CHEST: Coarse bilaterally  CV: RRR, no M/G/R  ABD: Soft, +BS  SKIN: Warm and Dry  NEURO: Sedated      WBC WBC   Date Value Ref Range Status   01/01/2021 14.50 (H) 3.40 - 10.80 10*3/mm3 Final   12/31/2020 16.10 (H) 3.40 - 10.80 10*3/mm3 Final   12/30/2020 22.20 (H) 3.40 - 10.80 10*3/mm3 Final   12/30/2020 21.10 (H) 3.40 - 10.80 10*3/mm3 Final   12/29/2020 21.20 (H) 3.40 - 10.80 10*3/mm3 Final      HGB Hemoglobin   Date Value Ref Range Status   01/01/2021 8.5 (L) 13.0 - 17.7 g/dL Final   12/31/2020 8.9 (L) 12.0 - 17.0 g/dL Final   12/31/2020 9.7 (L) 13.0 - 17.7 g/dL Final   12/30/2020 9.9 (L) 12.0 - 17.0 g/dL Final   12/30/2020 10.2 (L) 12.0 - 17.0 g/dL Final   12/30/2020 10.3 (L) 12.0 - 17.0 g/dL Final   12/30/2020 10.4 (L) 13.0 - 17.7 g/dL Final   12/30/2020 10.6 (L) 12.0 - 17.0 g/dL Final   12/30/2020 10.2 (L) 12.0 - 17.0 g/dL Final   12/30/2020 10.6 (L) 13.0 - 17.7 g/dL Final "   12/30/2020 10.8 (L) 12.0 - 17.0 g/dL Final   12/29/2020 9.2 (L) 13.0 - 17.7 g/dL Final     Comment:     Result checked    12/29/2020 8.8 (L) 12.0 - 17.0 g/dL Final   12/29/2020 9.9 (L) 12.0 - 17.0 g/dL Final   12/29/2020 9.5 (L) 12.0 - 17.0 g/dL Final   12/29/2020 9.9 (L) 12.0 - 17.0 g/dL Final   12/29/2020 9.5 (L) 12.0 - 17.0 g/dL Final   12/29/2020 9.5 (L) 12.0 - 17.0 g/dL Final   12/29/2020 12.6 12.0 - 17.0 g/dL Final   12/29/2020 14.6 12.0 - 17.0 g/dL Final      HCT Hematocrit   Date Value Ref Range Status   01/01/2021 25.6 (L) 37.5 - 51.0 % Final   12/31/2020 26 (L) 38 - 51 % Final   12/31/2020 29.5 (L) 37.5 - 51.0 % Final   12/30/2020 29 (L) 38 - 51 % Final   12/30/2020 30 (L) 38 - 51 % Final   12/30/2020 30 (L) 38 - 51 % Final   12/30/2020 30.2 (L) 37.5 - 51.0 % Final   12/30/2020 31 (L) 38 - 51 % Final   12/30/2020 30 (L) 38 - 51 % Final   12/30/2020 31.8 (L) 37.5 - 51.0 % Final   12/30/2020 32 (L) 38 - 51 % Final   12/29/2020 27.7 (L) 37.5 - 51.0 % Final   12/29/2020 26 (L) 38 - 51 % Final   12/29/2020 29 (L) 38 - 51 % Final   12/29/2020 28 (L) 38 - 51 % Final   12/29/2020 29 (L) 38 - 51 % Final   12/29/2020 28 (L) 38 - 51 % Final   12/29/2020 28 (L) 38 - 51 % Final   12/29/2020 37 (L) 38 - 51 % Final   12/29/2020 43 38 - 51 % Final      Platlets No results found for: LABPLAT   MCV MCV   Date Value Ref Range Status   01/01/2021 87.5 79.0 - 97.0 fL Final   12/31/2020 86.3 79.0 - 97.0 fL Final   12/30/2020 87.3 79.0 - 97.0 fL Final   12/30/2020 86.9 79.0 - 97.0 fL Final   12/29/2020 87.6 79.0 - 97.0 fL Final          Sodium Sodium   Date Value Ref Range Status   01/01/2021 145 136 - 145 mmol/L Final   12/31/2020 144 136 - 145 mmol/L Final   12/30/2020 143 136 - 145 mmol/L Final   12/30/2020 142 136 - 145 mmol/L Final   12/30/2020 142 136 - 145 mmol/L Final   12/30/2020 139 136 - 145 mmol/L Final   12/29/2020 137 136 - 145 mmol/L Final      Potassium Potassium   Date Value Ref Range Status   01/01/2021 3.5  3.5 - 5.2 mmol/L Final   12/31/2020 3.8 3.5 - 5.2 mmol/L Final   12/30/2020 4.0 3.5 - 5.2 mmol/L Final   12/30/2020 3.8 3.5 - 5.2 mmol/L Final   12/30/2020 3.9 3.5 - 5.2 mmol/L Final   12/30/2020 3.4 (L) 3.5 - 5.2 mmol/L Final     Comment:     Slight hemolysis detected by analyzer. Results may be affected.   12/30/2020 3.9 3.5 - 5.2 mmol/L Final     Comment:     Slight hemolysis detected by analyzer. Results may be affected.   12/29/2020 3.8 3.5 - 5.2 mmol/L Final      Chloride Chloride   Date Value Ref Range Status   01/01/2021 115 (H) 98 - 107 mmol/L Final   12/31/2020 111 (H) 98 - 107 mmol/L Final   12/30/2020 109 (H) 98 - 107 mmol/L Final   12/30/2020 106 98 - 107 mmol/L Final   12/30/2020 105 98 - 107 mmol/L Final   12/30/2020 105 98 - 107 mmol/L Final   12/29/2020 103 98 - 107 mmol/L Final      CO2 CO2   Date Value Ref Range Status   01/01/2021 21.0 (L) 22.0 - 29.0 mmol/L Final   12/31/2020 21.0 (L) 22.0 - 29.0 mmol/L Final   12/30/2020 23.0 22.0 - 29.0 mmol/L Final   12/30/2020 20.0 (L) 22.0 - 29.0 mmol/L Final   12/30/2020 21.0 (L) 22.0 - 29.0 mmol/L Final   12/30/2020 19.0 (L) 22.0 - 29.0 mmol/L Final   12/29/2020 20.0 (L) 22.0 - 29.0 mmol/L Final      BUN BUN   Date Value Ref Range Status   01/01/2021 36 (H) 8 - 23 mg/dL Final   12/31/2020 41 (H) 8 - 23 mg/dL Final   12/30/2020 45 (H) 8 - 23 mg/dL Final   12/30/2020 46 (H) 8 - 23 mg/dL Final   12/30/2020 48 (H) 8 - 23 mg/dL Final   12/30/2020 49 (H) 8 - 23 mg/dL Final   12/29/2020 53 (H) 8 - 23 mg/dL Final      Creatinine Creatinine   Date Value Ref Range Status   01/01/2021 1.43 (H) 0.76 - 1.27 mg/dL Final   12/31/2020 1.69 (H) 0.76 - 1.27 mg/dL Final   12/30/2020 1.90 (H) 0.76 - 1.27 mg/dL Final   12/30/2020 1.89 (H) 0.76 - 1.27 mg/dL Final   12/30/2020 1.72 (H) 0.76 - 1.27 mg/dL Final   12/30/2020 1.62 (H) 0.76 - 1.27 mg/dL Final   12/29/2020 1.41 (H) 0.76 - 1.27 mg/dL Final      Calcium Calcium   Date Value Ref Range Status   01/01/2021 8.3 (L) 8.6 -  10.5 mg/dL Final   12/31/2020 8.7 8.6 - 10.5 mg/dL Final   12/30/2020 8.8 8.6 - 10.5 mg/dL Final   12/30/2020 8.7 8.6 - 10.5 mg/dL Final   12/30/2020 9.0 8.6 - 10.5 mg/dL Final   12/30/2020 8.7 8.6 - 10.5 mg/dL Final   12/29/2020 9.2 8.6 - 10.5 mg/dL Final      PO4 No results found for: CAPO4   Albumin Albumin   Date Value Ref Range Status   12/31/2020 3.70 3.50 - 5.20 g/dL Final   12/30/2020 4.10 3.50 - 5.20 g/dL Final   12/30/2020 3.80 3.50 - 5.20 g/dL Final   12/29/2020 3.70 3.50 - 5.20 g/dL Final      Magnesium Magnesium   Date Value Ref Range Status   12/31/2020 2.9 (H) 1.6 - 2.4 mg/dL Final   12/30/2020 3.0 (H) 1.6 - 2.4 mg/dL Final   12/30/2020 3.0 (H) 1.6 - 2.4 mg/dL Final   12/29/2020 2.3 1.6 - 2.4 mg/dL Final      Uric Acid No results found for: URICACID        Results Review:     I reviewed the patient's new clinical results.    aspirin, 81 mg, Oral, Daily  ceFAZolin, 3 g, Intravenous, Q12H  chlorhexidine, 15 mL, Mouth/Throat, Q12H  enoxaparin, 40 mg, Subcutaneous, Q12H  famotidine, 20 mg, Intravenous, Q12H  insulin lispro, 0-14 Units, Subcutaneous, Q6H  mupirocin, , Each Nare, BID  polyethylene glycol, 17 g, Oral, BID  rosuvastatin, 20 mg, Oral, Daily  senna-docusate sodium, 2 tablet, Oral, BID  sodium chloride, 10 mL, Intravenous, Q12H      amiodarone, 1 mg/min, Last Rate: 1 mg/min (01/01/21 0128)  dexmedetomidine, 0.2-1.5 mcg/kg/hr, Last Rate: 1.5 mcg/kg/hr (01/01/21 0632)  EPINEPHrine, 0.02-0.3 mcg/kg/min, Last Rate: 0.06 mcg/kg/min (01/01/21 0509)  insulin, 0-50 Units/hr, Last Rate: 4.2 Units/hr (01/01/21 0638)  milrinone, 0.25-0.75 mcg/kg/min, Last Rate: 0.25 mcg/kg/min (01/01/21 0602)  niCARdipine, 5-15 mg/hr  norepinephrine, 0.02-0.3 mcg/kg/min, Last Rate: 0.04 mcg/kg/min (01/01/21 0509)  phenylephrine, 0.5-3 mcg/kg/min, Last Rate: 0.5 mcg/kg/min (12/29/20 7215)  sodium chloride, 30 mL/hr  vasopressin, 0.02-0.1 Units/min, Last Rate: 0.03 Units/min (12/31/20 5529)        Medication Review:  Reviewed    Assessment/Plan       Non-ST elevation myocardial infarction (NSTEMI) (CMS/[facility] within [facility])    Chest pain    Acute exacerbation of CHF (congestive heart failure) (CMS/[facility])    Acute renal insufficiency    Hyperlipidemia    Hypertension    Coronary artery disease    Essential hypertension    Coronary artery disease involving native coronary artery of native heart without angina pectoris    NSTEMI (non-ST elevated myocardial infarction) (CMS/[facility])    Coronary artery disease involving native coronary artery of native heart with unstable angina pectoris (CMS/[facility])    Hypokalemia    Hypocalcemia    Plan: Patient with SILKE/CKD s/p emergent CABG and MV repair.  Cr better at 1.43 this AM and patient is non-oliguric.  Continue to work to maintain stable hemodynamics.  Lasix prn.  Avoid nephrotoxins.  Replacing K.  Will follow closely.      Tal Redd MD   [facility]  01/01/21  08:13 EST

## 2021-01-01 NOTE — PROGRESS NOTES
S/P POD# 3 ER CABG and MV repair--[place]  EF 20% (Dr. Calderon op note)    Subjective:  Intubated, lightly sedated, trying to communicate, nodding appropriately--still wants tube out    PAF again, Tm 101.8 several times yest  Increased epi and added milr per Dr. Durham for CI 1.6 this morning  Drips:  Epi .06, milr .25, norepi .04, vaso .03, amio 1, dex, insulin  CI 2.21, CVP 16 flat, SVR 1019  Mixed venous sat 62%  IABP (preop placement by cardiology) 1:1  Wt up 3 kg from preop  CXR:  Atel, IABP placement ok      Intake/Output Summary (Last 24 hours) at 1/1/2021 0955  Last data filed at 1/1/2021 0900  Gross per 24 hour   Intake 4246 ml   Output 2235 ml   Net 2011 ml     Temp:  [99.5 °F (37.5 °C)-101.8 °F (38.8 °C)] 99.5 °F (37.5 °C)  Heart Rate:  [78-98] 98  Resp:  [17] 17  Arterial Line BP: (105-141)/(50-68) 137/58  FiO2 (%):  [40 %] 40 %  /8  /8    Results from last 7 days   Lab Units 01/01/21  0333 12/31/20  0832 12/31/20  0357  12/30/20  0106  12/29/20  2352   WBC 10*3/mm3 14.50*  --  16.10*   < > 21.10*  --  21.20*   HEMOGLOBIN g/dL 8.5*  --  9.7*   < > 10.6*  --  9.2*   HEMOGLOBIN, POC g/dL  --  8.9*  --    < >  --    < >  --    HEMATOCRIT % 25.6*  --  29.5*   < > 31.8*  --  27.7*   HEMATOCRIT POC %  --  26*  --    < >  --    < >  --    PLATELETS 10*3/mm3 131*  --  191   < > 261  --  238  238   INR   --   --  1.19*  --  1.18*  --  1.17*    < > = values in this interval not displayed.     Results from last 7 days   Lab Units 01/01/21  0333 12/31/20  0357   CREATININE mg/dL 1.43* 1.69*   POTASSIUM mmol/L 3.5 3.8   SODIUM mmol/L 145 144   MAGNESIUM mg/dL  --  2.9*   PHOSPHORUS mg/dL  --  2.7     ica pending    Physical Exam:  Neuro intact, nad, as above, family at bedside  Tele:  Afib 90s with BBB  Coarse sounds, FiO2 40%/+5 PEEP, tan secretions  dsg c/d/i, scant old drainage  Benign abd, no BM  + BLE edema    Assessment/Plan:  Principal Problem:    Non-ST elevation myocardial infarction (NSTEMI)  (CMS/[facility])  Active Problems:    Chest pain    Acute exacerbation of CHF (congestive heart failure) (CMS/[facility])    Acute renal insufficiency    Hyperlipidemia    Hypertension    Coronary artery disease    Essential hypertension    Coronary artery disease involving native coronary artery of native heart without angina pectoris    NSTEMI (non-ST elevated myocardial infarction) (CMS/[facility])    Coronary artery disease involving native coronary artery of native heart with unstable angina pectoris (CMS/[facility])    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 Pneumoniae--cont Kefzol   Postop fevers--watch closely, wbc decreasing    POD#3.  Requiring more inotropic.  Continues to be febrile--treating UTI, sputum culture pending, checking for DVTs.  Prognosis still guarded.  Cr 1.43--renal following.  Pt is getting IV Ativan and morphine to augment sedation.  Pt is refusing oral care, turning but can be convinced to let nsg do it but becomes very agitated.  Nutrition to provide TF recs for future.  Long d/w wife and nickolas at bedside.    Routine care  Did NOT de-escal  D/w pt/family, Dr. Calderon, Dr. Durham, nsg  Anticipate rehab at discharge    Manjula Hurt, APRN  1/1/2021  09:55 EST

## 2021-01-01 NOTE — PROGRESS NOTES
Cardiology Progress Note      Admiting Physician:  Jr Keyur Calderon MD   LOS: 3 days       Reason For Followup:  Coronary artery disease  Non-STEMI  Cardiogenic shock  Ventricular tachycardia/fibrillation  CABG    Subjective:    Interval History:    Patient was developing intermittent VT and torsade and prolonged QT therefore amiodarone was discontinued.  And patient was started on lidocaine patient had V. fib arrest and standard ACLS protocol was followed but did not survive.  Dr. Butterfield intensivist was present.  Discussed with patient's family by bedside.      Assessment & Plan    Impressions:  Coronary artery disease status post emergent 6 vessel CABG 12/29/2020  Mitral insufficiency status post mitral valve ring annuloplasty 12/29/2020.  Non-STEMI  Ischemic cardiomyopathy EF 20 to 25%  Ventricular tachycardia/ventricular fibrillation-ischemic triggered  Cardiogenic shock  Intra-aortic balloon pump  Morbid obesity  Renal failure  Hypertensive cardiovascular disease  Congestive heart failure acute systolic  Postop A. Fib  Fever   V. fib arrest    Recommendations:    Patient has been on intra-aortic balloon pump temporary pacemaker maximum vasopressor support and standard ACLS did not survive.  Spent half hour critical care time.  Discussed with RN taking care of patient.  Discussed with Dr. Butterfield.  Discussed with family.    Objective:    Medication Review:   Scheduled Meds:aspirin, 81 mg, Oral, Daily  chlorhexidine, 15 mL, Mouth/Throat, Q12H  enoxaparin, 40 mg, Subcutaneous, Q12H  famotidine, 20 mg, Intravenous, Q12H  insulin lispro, 0-14 Units, Subcutaneous, Q6H  meropenem, 1 g, Intravenous, Q8H  mupirocin, , Each Nare, BID  polyethylene glycol, 17 g, Oral, BID  rosuvastatin, 20 mg, Oral, Daily  senna-docusate sodium, 2 tablet, Oral, BID  sodium chloride, 10 mL, Intravenous, Q12H      Continuous Infusions:amiodarone, 1 mg/min, Last Rate: Stopped (01/01/21 7702)  dexmedetomidine, 0.2-1.5 mcg/kg/hr, Last Rate:  1.5 mcg/kg/hr (01/01/21 1653)  EPINEPHrine, 0.02-0.3 mcg/kg/min, Last Rate: 0.06 mcg/kg/min (01/01/21 1150)  insulin, 0-50 Units/hr, Last Rate: 4.2 Units/hr (01/01/21 1030)  lidocaine cardiac, 2 mg/min, Last Rate: 2 mg/min (01/01/21 1645)  milrinone, 0.25-0.75 mcg/kg/min, Last Rate: 0.25 mcg/kg/min (01/01/21 1401)  niCARdipine, 5-15 mg/hr  norepinephrine, 0.02-0.3 mcg/kg/min, Last Rate: 0.04 mcg/kg/min (01/01/21 0509)  Pharmacy to dose vancomycin,   phenylephrine, 0.5-3 mcg/kg/min, Last Rate: 0.5 mcg/kg/min (12/29/20 1745)  sodium chloride, 30 mL/hr  vasopressin, 0.02-0.1 Units/min, Last Rate: 0.03 Units/min (01/01/21 1150)      PRN Meds:.•  acetaminophen **OR** acetaminophen **OR** acetaminophen  •  acetaminophen  •  ALPRAZolam  •  aluminum-magnesium hydroxide-simethicone  •  bisacodyl  •  bisacodyl  •  Chlorhexidine Gluconate Cloth  •  cyclobenzaprine  •  dextrose  •  dextrose  •  EPINEPHrine  •  glucagon (human recombinant)  •  HYDROcodone-acetaminophen  •  insulin lispro **AND** insulin lispro  •  insulin  •  ipratropium-albuterol  •  LORazepam  •  magnesium hydroxide  •  magnesium sulfate **OR** magnesium sulfate **OR** magnesium sulfate  •  melatonin  •  milrinone  •  Morphine **AND** naloxone  •  niCARdipine  •  norepinephrine  •  ondansetron **OR** ondansetron  •  oxyCODONE  •  Pharmacy to dose vancomycin  •  potassium chloride **OR** potassium chloride  •  [COMPLETED] Insert peripheral IV **AND** sodium chloride  •  sodium chloride  •  sodium chloride  •  sodium chloride  •  sodium chloride  •  vasopressin    Patient Active Problem List   Diagnosis   • Chest pain   • Acute exacerbation of CHF (congestive heart failure) (CMS/[facility], located within [facility])   • Non-ST elevation myocardial infarction (NSTEMI) (CMS/HCC)   • Acute renal insufficiency   • Hyperlipidemia   • Hypertension   • Coronary artery disease   • Essential hypertension   • Coronary artery disease involving native coronary artery of native heart without angina pectoris    • NSTEMI (non-ST elevated myocardial infarction) (CMS/HCC)   • Coronary artery disease involving native coronary artery of native heart with unstable angina pectoris (CMS/HCC)           Vital Signs:  Vitals:    01/01/21 1500 01/01/21 1600 01/01/21 1602 01/01/21 1731   BP:       Pulse: 99 93 96 60   Resp:       Temp:       TempSrc:       SpO2: 99% 99% 100% (!) 69%   Weight:       Height:         Wt Readings from Last 1 Encounters:   01/01/21 (!) 151 kg (333 lb 8.9 oz)       Intake/Output Summary (Last 24 hours) at 1/1/2021 1748  Last data filed at 1/1/2021 1400  Gross per 24 hour   Intake 2719 ml   Output 1740 ml   Net 979 ml         Results Review:     CBC    Results from last 7 days   Lab Units 01/01/21  1717 01/01/21  0333 12/31/20  0832 12/31/20  0357 12/30/20  0746 12/30/20  0445 12/30/20  0408 12/30/20  0403  12/30/20  0106  12/29/20  2352  12/29/20  0507 12/28/20  2031   WBC 10*3/mm3  --  14.50*  --  16.10*  --   --   --  22.20*  --  21.10*  --  21.20*  --  12.60* 14.10*   HEMOGLOBIN g/dL  --  8.5*  --  9.7*  --   --   --  10.4*  --  10.6*  --  9.2*  --  13.8 14.3   HEMOGLOBIN, POC g/dL 5.9*  --  8.9*  --  9.9* 10.2* 10.3*  --    < >  --    < >  --    < >  --   --    PLATELETS 10*3/mm3  --  131*  --  191  --   --   --  239  --  261  --  238  238  --  226 224    < > = values in this interval not displayed.     Cr Clearance Estimated Creatinine Clearance: 65.7 mL/min (A) (by C-G formula based on SCr of 1.43 mg/dL (H)).  Coag   Results from last 7 days   Lab Units 12/31/20  0357 12/30/20  0106 12/29/20  2352 12/29/20  1639 12/29/20  0507 12/28/20 2031 12/28/20  1210   INR  1.19* 1.18* 1.17* 1.05  --  1.02 1.04   APTT seconds  --  27.4 25.2 28.7* 36.5* 41.5* 26.4     HbA1C   Lab Results   Component Value Date    HGBA1C 5.7 (H) 12/29/2020     Blood Glucose   Glucose   Date/Time Value Ref Range Status   01/01/2021 1717 102 (H) 74 - 100 mg/dL Final   01/01/2021 1717 102 (H) 74 - 100 mg/dL Final     Comment:      Serial Number: [id number]:  [id number]   01/01/2021 1354 123 (H) 70 - 105 mg/dL Final     Comment:     Serial Number: [id number]:  [id number]   01/01/2021 1243 122 (H) 70 - 105 mg/dL Final     Comment:     Serial Number: [id number]:  [id number]   01/01/2021 1028 124 (H) 70 - 105 mg/dL Final     Comment:     Serial Number: [id number]:  [id number]   01/01/2021 0828 117 (H) 74 - 100 mg/dL Final     Comment:     Serial Number: [id number]:  [id number]   01/01/2021 0628 122 (H) 70 - 105 mg/dL Final     Comment:     Serial Number: [id number]:  [id number]   01/01/2021 0302 118 (H) 70 - 105 mg/dL Final     Comment:     Serial Number: [id number]:  [id number]     Infection   Results from last 7 days   Lab Units 12/29/20  1744 12/28/20  1210   URINECX  >100,000 CFU/mL Klebsiella pneumoniae ssp pneumoniae*  --    PROCALCITONIN ng/mL  --  0.07     CMP   Results from last 7 days   Lab Units 01/01/21  1241 01/01/21  0333 12/31/20  0357 12/30/20  1602 12/30/20  1126 12/30/20  0403 12/30/20  0106 12/29/20  1639   SODIUM mmol/L  --  145 144 143 142 142 139 137   POTASSIUM mmol/L 3.5 3.5 3.8 4.0 3.9  3.8 3.4* 3.9 3.8   CHLORIDE mmol/L  --  115* 111* 109* 106 105 105 103   CO2 mmol/L  --  21.0* 21.0* 23.0 20.0* 21.0* 19.0* 20.0*   BUN mg/dL  --  36* 41* 45* 46* 48* 49* 53*   CREATININE mg/dL  --  1.43* 1.69* 1.90* 1.89* 1.72* 1.62* 1.41*   GLUCOSE mg/dL  --  113* 140* 163* 173* 182* 194* 141*   ALBUMIN g/dL  --   --  3.70  --   --  4.10 3.80 3.70   BILIRUBIN mg/dL  --   --   --   --   --   --   --  0.7   ALK PHOS U/L  --   --   --   --   --   --   --  49   AST (SGOT) U/L  --   --   --   --   --   --   --  25   ALT (SGPT) U/L  --   --   --   --   --   --   --  21     ABG    Results from last 7 days   Lab Units 01/01/21  1717 01/01/21  0828 01/01/21  0319 12/31/20  0832 12/31/20  0300 12/30/20  1048 12/30/20  0746 12/30/20  0445   PH, ARTERIAL pH units 7.598*  --  7.435 7.440 7.449 7.367 7.377 7.367   PCO2,  ARTERIAL mm Hg 15.5*  --  32.8* 31.7* 32.7* 34.3* 37.2 37.0   PO2 ART mm Hg 443.9*  --  102.1 96.2 107.7 91.8 95.3 74.7*   O2 SATURATION ART % 100.0*  --  98.2* 97.8 98.5* 96.9 97.3 94.4   BASE EXCESS ART mmol/L -5.9* -2.0* -1.8* -2.2* -0.9* -5.1* -3.0* -3.6*     UA    Results from last 7 days   Lab Units 12/29/20  1744   NITRITE UA  Negative   WBC UA /HPF 13-20*   BACTERIA UA /HPF Trace*   SQUAM EPITHEL UA /HPF 0-2   URINECX  >100,000 CFU/mL Klebsiella pneumoniae ssp pneumoniae*     INDRA  No results found for: POCMETH, POCAMPHET, POCBARBITUR, POCBENZO, POCCOCAINE, POCOPIATES, POCOXYCODO, POCPHENCYC, POCPROPOXY, POCTHC, POCTRICYC  Lysis Labs   Results from last 7 days   Lab Units 01/01/21  1717 01/01/21  0333 12/31/20  0832 12/31/20  0357 12/30/20  1602 12/30/20  1126 12/30/20  0746 12/30/20  0445 12/30/20  0408 12/30/20  0403  12/30/20  0106  12/29/20  2352  12/29/20  1639 12/29/20  0507 12/28/20  2031 12/28/20  1210   INR   --   --   --  1.19*  --   --   --   --   --   --   --  1.18*  --  1.17*  --  1.05  --  1.02 1.04   APTT seconds  --   --   --   --   --   --   --   --   --   --   --  27.4  --  25.2  --  28.7* 36.5* 41.5* 26.4   FIBRINOGEN mg/dL  --   --   --   --   --   --   --   --   --   --   --  423  --  423  --   --   --   --   --    HEMOGLOBIN g/dL  --  8.5*  --  9.7*  --   --   --   --   --  10.4*  --  10.6*  --  9.2*  --   --  13.8 14.3 13.1   HEMOGLOBIN, POC g/dL 5.9*  --  8.9*  --   --   --  9.9* 10.2* 10.3*  --    < >  --    < >  --    < >  --   --   --   --    PLATELETS 10*3/mm3  --  131*  --  191  --   --   --   --   --  239  --  261  --  238  238  --   --  226 224 207   CREATININE mg/dL  --  1.43*  --  1.69* 1.90* 1.89*  --   --   --  1.72*  --  1.62*  --   --   --  1.41* 1.60*  --  1.84*    < > = values in this interval not displayed.     Radiology(recent) Xr Chest 1 View    Result Date: 1/1/2021  No interval change from yesterday's study with abnormalities as described.  Electronically Signed  By-Gary Meza MD On:1/1/2021 9:42 AM This report was finalized on [id number] by  Gary Meza MD.    Xr Chest 1 View    Result Date: 12/31/2020   1. Support tubes and line, as above. 2. Stable patchy left basilar opacity with blunting of the left costophrenic angle, likely reflecting a small pleural effusion and underlying atelectasis, postoperative change, and/or pneumonia. 3. Stable medial opacities in the right lung, which could represent atelectasis and/or postoperative change. 4. Stable widening of the cardiac and mediastinal contours, which could reflect postoperative changes, likely accentuated by technique.  Electronically Signed By-Alvino Dunbar MD On:12/31/2020 7:50 AM This report was finalized on [id number] by  Alvino Dunbar MD.        Results from last 7 days   Lab Units 12/29/20  0751   TROPONIN T ng/mL 1.530*       Imaging Results (Last 24 Hours)     Procedure Component Value Units Date/Time    XR Chest 1 View [[id number]] Collected: 01/01/21 0940     Updated: 01/01/21 0945    Narrative:      DATE OF EXAM:  1/1/2021 4:31 AM     PROCEDURE:  XR CHEST 1 VW-     INDICATIONS:  Myocardial infarction, status post cardiac surgery, chest pain, coronary  artery disease.      COMPARISON:  12/31/2020.     TECHNIQUE:   Single radiographic view of the chest was obtained.     FINDINGS:  There are bilateral chest tubes as well as a mediastinal chest tube in  place. There is no pneumothorax. The Carrizozo-Amada catheter and endotracheal  tube remain stable. The nasogastric tube tip projects out of the  field-of-view, but below the hemidiaphragms. The heart is enlarged.  There is abnormal right perihilar consolidation and patchy consolidation  throughout the left periareolar region and left lung base probably due  to atelectasis although I cannot exclude patchy airspace disease. The  right pleural space is clear. The patient has a small left pleural  effusion. There is no interval change from yesterday's study.           Impression:      No interval change from yesterday's study with abnormalities as  described.     Electronically Signed By-Gary Meza MD On:1/1/2021 9:42 AM  This report was finalized on [id number] by  Gary Meza MD.          Cardiac Studies:  Echo-   Results for orders placed during the hospital encounter of 12/28/20   Adult Transthoracic Echo Complete W/ Cont if Necessary Per Protocol    Narrative · Estimated left ventricular EF was in agreement with the calculated left   ventricular EF. Left ventricular ejection fraction appears to be 36 - 40%.  · The following left ventricular wall segments are akinetic: apical   anterior, apical lateral, apical inferior, apical septal and apex.        Stress Myoview-  Cath-        Montrell Stewart MD  01/01/21  17:48 EST

## 2021-01-02 LAB — QT INTERVAL: 457 MS

## 2021-01-02 NOTE — DISCHARGE SUMMARY
Date of Admission: 1/28/2020  Date of Discharge:  1/2/2021    Discharge Diagnosis:  Severe coronary artery disease.  Multiple episodes of sudden death with ventricular tachycardia and ventricular fibrillation.  Cardiogenic shock.  Ischemic cardiomyopathy with ejection fraction of 20%.  Moderate severe ischemic mitral incompetence.  Morbid obesity.  Chronic kidney disease stage III.  Congestive heart failure class IV left systolic chronic and acute.  Bilateral pleural effusions.  Severe pulmonary hypertension.    Presenting Problem/History of Present Illness  Acute pulmonary edema (CMS/[facility]) [J81.0]  Essential hypertension [I10]  Exertional dyspnea [R06.00]  Acute renal insufficiency [N28.9]  Non-ST elevation myocardial infarction (NSTEMI) (CMS/[facility]) [I21.4]  Elevated troponin [R77.8]  Coronary artery disease involving native coronary artery of native heart without angina pectoris [I25.10]  Chest pain, unspecified type [R07.9]  NSTEMI (non-ST elevated myocardial infarction) (CMS/[facility]) [I21.4]     Hospital Course  Patient is a 75 y.o. male presented with a long history of cardiac problems that he did not go to the hospital for.  He was admitted to the cardiology service and had episodes of V. tach x2 and then went to the Cath Lab.  He had severe coronary disease with left ventricular dysfunction and a balloon pump was placed.  We will plan to do surgery the next day but then he had an episode of V. tach that went to V. fib and he was emergently taken to the operating room.  He underwent the above procedure.  This was a salvage case and he was on multiple drips postoperatively.  He remained intubated with a balloon pump and was making slow progress.  On the day of death he had an episode of ventricular tachycardia that looks like torsades.  His amiodarone was discontinued and he was placed on lidocaine.  He had a episode of ventricular tachycardia that went to V. fib and could not be resuscitated per ACLS protocol.  His  ventricle I think was so severely damaged that there is no way he could recover from this event.  Time of death was 1743    Procedures Performed  Procedure(s):  CORONARY ARTERY BYPASS WITH INTERNAL MAMMARY ARTERY GRAFT AND MITRAL VALVE REPAIR  2300 Insert Arterial Line    Consults:   Consults     Date and Time Order Name Status Description    2021 1056 Inpatient Infectious Diseases Consult Completed     2020 1039 Inpatient Pulmonology Consult Completed     2020 0844 Inpatient Nephrology Consult Completed     2020 1332 Cardiology (on-call MD unless specified) Completed     2020 1301 Hospitalist (on-call MD unless specified) Completed           Pertinent Test Results:    Lab Results   Component Value Date    WBC 14.50 (H) 2021    HGB 5.9 (L) 2021    HCT 17 (L) 2021    MCV 87.5 2021     (L) 2021      Lab Results   Component Value Date    GLUCOSE 113 (H) 2021    CALCIUM 8.3 (L) 2021     2021    K 3.5 2021    CO2 21.0 (L) 2021     (H) 2021    BUN 36 (H) 2021    CREATININE 1.43 (H) 2021    EGFRIFNONA 48 (L) 2021    BCR 25.2 (H) 2021    ANIONGAP 9.0 2021     Lab Results   Component Value Date    INR 1.19 (H) 2020    PROTIME 13.0 (H) 2020         Condition on Discharge:     Vital Signs  Temp:  [98.4 °F (36.9 °C)-99 °F (37.2 °C)] 98.4 °F (36.9 °C)  Heart Rate:  [] 60  Arterial Line BP: (119-137)/(63-68) 137/68  FiO2 (%):  [40 %] 40 %      Discharge Disposition      Discharge Medications     Discharge Medications      ASK your doctor about these medications      Instructions Start Date   allopurinol 100 MG tablet  Commonly known as: ZYLOPRIM   2 Times Daily      aspirin 81 MG tablet   81 mg, Oral, Daily      carvedilol 6.25 MG tablet  Commonly known as: COREG  Ask about: Which instructions should I use?   6.25 mg, Oral, 3 Times Daily With Meals       carvedilol 6.25 MG tablet  Commonly known as: COREG  Ask about: Which instructions should I use?   6.25 mg, Oral, Every Evening      clopidogrel 75 MG tablet  Commonly known as: PLAVIX   75 mg, Oral, Daily      irbesartan 300 MG tablet  Commonly known as: AVAPRO   300 mg, Oral, Nightly      multivitamin tablet tablet  Commonly known as: THERAGRAN   1 tablet, Oral, Daily      nitroglycerin 0.4 MG SL tablet  Commonly known as: NITROSTAT   0.4 mg, Sublingual, Every 5 Minutes PRN, Take no more than 3 doses in 15 minutes.      rosuvastatin 20 MG tablet  Commonly known as: CRESTOR   20 mg, Oral, Daily      triamterene-hydrochlorothiazide 37.5-25 MG per capsule  Commonly known as: DYAZIDE   1 capsule, Oral, Daily             Discharge Diet:     Activity at Discharge:     Follow-up Appointments  No future appointments.      Test Results Pending at Discharge  Pending Labs     Order Current Status    Blood Culture - Blood, Arm, Right In process    Blood Culture - Blood, Blood, Arterial Line In process    Blood Culture - Blood, Blood, Venous Line In process    Respiratory Culture - Sputum, ET Suction Preliminary result           Keyur Calderon MD  01/02/21  10:19 EST

## 2021-01-02 NOTE — CODE DOCUMENTATION
Patient underwent code for after the development of ventricular tachycardia/ventricular fibrillation, with torsade de points appearing rhythms at times.  Patient was attempted to be overdrive paced, but it was unsuccessful, in which patient had the loss of pulse, and code 4 was initiated at 1708, in which ACLS medications, compressions, and bag-valve-mask ventilation were initiated.  Cardiothoracic surgery and cardiology were both informed.  Intensivist team arrived and helped direct code, and medications were given as documented by nursing staff.  Unfortunately throughout the entire code, despite exhaustive efforts, patient did not regain a pulse.  Intensivist team and cardiologist both spoke with patient's family, and resuscitative efforts were terminated, with patient's time of death called at 1743.  Please see code documentation for specific events.  Patient's family was present throughout the entire code, with nursing providing emotional support.  All teams efforts were greatly appreciated.    Electronically signed by MAYRA Castillo, 01/01/21 at 23:53 EST.

## 2021-01-03 LAB
BACTERIA SPEC RESP CULT: NORMAL
GRAM STN SPEC: NORMAL

## 2021-01-06 LAB
BACTERIA SPEC AEROBE CULT: NORMAL

## 2021-01-08 LAB
QT INTERVAL: 339 MS
QT INTERVAL: 511 MS

## (undated) DEVICE — CATHETER,URETHRAL,REDRUBBER,STERILE,20FR: Brand: MEDLINE

## (undated) DEVICE — SUT SILK 2 SUTUPAK TIE 60IN SA8H 2STRAND

## (undated) DEVICE — SUCTION CANISTER, 1500CC,SAFELINER: Brand: DEROYAL

## (undated) DEVICE — GLIDESHEATH SLENDER STAINLESS STEEL KIT: Brand: GLIDESHEATH SLENDER

## (undated) DEVICE — SUT PROLN 5/0 V5 36IN 8934H

## (undated) DEVICE — BOOT SUT XRAY DETECT STD YEL/BLU CA/50

## (undated) DEVICE — SUT PROLN 4/0 BB D/A 36IN 8581H

## (undated) DEVICE — BLOOD TRANSFUSION FILTER: Brand: HAEMONETICS

## (undated) DEVICE — CONNECT Y INTERSEPT W/LL 3/8 X 3/8 X 3/8IN

## (undated) DEVICE — Device

## (undated) DEVICE — ST BLD COL SAFETYLOK LS 23GA 3/4IN 7IN

## (undated) DEVICE — SUT SILK 2/0 TIES 18IN A185H

## (undated) DEVICE — ELECTRD BLD EZ CLN STD 6.5IN

## (undated) DEVICE — VASOVIEW HEMOPRO: Brand: VASOVIEW HEMOPRO

## (undated) DEVICE — CORONARY ARTERY BYPASS GRAFT MARKERS, STAINLESS STEEL, DISTAL, WITHOUT HOLDER: Brand: ANASTOMARK CORONARY ARTERY BYPASS GRAFT MARKERS, STAINLESS STEEL, DISTAL

## (undated) DEVICE — BLOWER MISTER CLEARVIEW W/TBG

## (undated) DEVICE — GLV SURG BIOGEL LTX PF 7 1/2

## (undated) DEVICE — TEMP PACING WIRE: Brand: MYO/WIRE

## (undated) DEVICE — BLD SCLPL BEAVR MINI STR 2BVL 180D LF

## (undated) DEVICE — 28 FR STRAIGHT – SOFT PVC CATHETER: Brand: PVC THORACIC CATHETERS

## (undated) DEVICE — RADIFOCUS OPTITORQUE ANGIOGRAPHIC CATHETER: Brand: OPTITORQUE

## (undated) DEVICE — PRESSURE TUBING: Brand: TRUWAVE

## (undated) DEVICE — PK OPN HEART WHT WRP 50

## (undated) DEVICE — COUNT NDL MAG XLG

## (undated) DEVICE — 32 FR RIGHT ANGLE – SOFT PVC CATHETER: Brand: PVC THORACIC CATHETERS

## (undated) DEVICE — SUT ETHIB 0 CT1 CR8 18IN CX21D

## (undated) DEVICE — HEMOCONCENTRATOR PERFUS LPS06

## (undated) DEVICE — CANN ART EOPA 3D NV W/CONN 22F

## (undated) DEVICE — SUT SILK 0 CT1 CR8 18IN C021D

## (undated) DEVICE — IRRIGATOR BULB ASEPTO 60CC STRL

## (undated) DEVICE — GLIDESHEATH SLENDER ACCESS KIT: Brand: GLIDESHEATH SLENDER

## (undated) DEVICE — KT DYEVERT PLS EZ W/SMART SYR FOR HI VISC CONTRST DISP

## (undated) DEVICE — SUT SILK 2/0 SH CR8 18IN CR8 C012D

## (undated) DEVICE — CANN AORT ROOT DLP 12G 9F

## (undated) DEVICE — OASIS DRAIN, SINGLE, INLINE & ATS COMPATIBLE: Brand: OASIS

## (undated) DEVICE — SUT PROLN 6/0 C1 D/A 30IN 8706H

## (undated) DEVICE — ORGANIZER SUT SHELIGH 3T 213013

## (undated) DEVICE — TUBING EXTENSION SET: Brand: ARGYLE

## (undated) DEVICE — PK TRY HEART CATH 50

## (undated) DEVICE — SUT VIC COAT PLS ANTIBAC TP1 27IN

## (undated) DEVICE — SPONGE,DISSECTOR,ROUND CHERRY,XR,ST,5/PK: Brand: MEDLINE

## (undated) DEVICE — SUT SILK 4/0 TIES 18IN A183H

## (undated) DEVICE — SUT ETHIB 2/0 CV V7 30IN 10X72

## (undated) DEVICE — TUBING, SUCTION, 1/4" X 12', STRAIGHT: Brand: MEDLINE

## (undated) DEVICE — GW EMR FIX EXCHG J STD .035 3MM 260CM

## (undated) DEVICE — SOL IRR H2O BTL 1000ML STRL

## (undated) DEVICE — PK PERFUS CUST W/CARDIOPLEGIA

## (undated) DEVICE — PK ATS CUST W CARDIOTOMY RESEVOIR

## (undated) DEVICE — ADAPT ANTEGRADE RETRGR

## (undated) DEVICE — ANTIBACTERIAL UNDYED BRAIDED (POLYGLACTIN 910), SYNTHETIC ABSORBABLE SUTURE: Brand: COATED VICRYL

## (undated) DEVICE — SUT PROLN 8/0 BV130/5 D/A 24IN 8732H

## (undated) DEVICE — SOL IRR NACL 0.9PCT BT 1000ML

## (undated) DEVICE — CABL BIPOLAR W/BOXEND 6FT DISP

## (undated) DEVICE — SYS PERFUS SEP PLATLT W TIPS CUST

## (undated) DEVICE — TBG INSUFF MALE L/L W 12MM CON: Brand: MEDLINE INDUSTRIES, INC.

## (undated) DEVICE — SUT VIC 0 CT1 CR8 DYED JJ31G

## (undated) DEVICE — INTENDED FOR TISSUE SEPARATION, AND OTHER PROCEDURES THAT REQUIRE A SHARP SURGICAL BLADE TO PUNCTURE OR CUT.: Brand: BARD-PARKER ® CARBON RIB-BACK BLADES

## (undated) DEVICE — DRAPE, RADIAL, STERILE: Brand: MEDLINE

## (undated) DEVICE — SENSR CERBRL O2 PK/2

## (undated) DEVICE — SOL NACL 0.9PCT 1000ML

## (undated) DEVICE — ADAPT CARDIO VNT Y/TYP

## (undated) DEVICE — ROTATING SURGICAL PUNCHES, 1 PER POUCH: Brand: A&E MEDICAL / ROTATING SURGICAL PUNCHES

## (undated) DEVICE — PINNACLE INTRODUCER SHEATH: Brand: PINNACLE

## (undated) DEVICE — ELECTRD DEFIB M/FUNC PROPADZ STRL 2PK

## (undated) DEVICE — CONTAINER,SPECIMEN,OR STERILE,4OZ: Brand: MEDLINE

## (undated) DEVICE — TOWEL,OR,DSP,ST,WHITE,DLX,4/PK,20PK/CS: Brand: MEDLINE

## (undated) DEVICE — BALN IAB SENSATION PLS F/O 40CC 7.5F 15CM SYS

## (undated) DEVICE — DRAPE SHEET ULTRAGARD: Brand: MEDLINE

## (undated) DEVICE — GAUZE,SPONGE,4"X4",32PLY,XRAY,STRL,LF: Brand: MEDLINE

## (undated) DEVICE — SUT PROLN 7/0 BV1 D/A 30IN 8703H

## (undated) DEVICE — BG BLD SYS

## (undated) DEVICE — TP UMB COTN 1/8X36 U12T